# Patient Record
Sex: MALE | Race: WHITE | Employment: OTHER | URBAN - METROPOLITAN AREA
[De-identification: names, ages, dates, MRNs, and addresses within clinical notes are randomized per-mention and may not be internally consistent; named-entity substitution may affect disease eponyms.]

---

## 2017-12-01 PROCEDURE — 83010 ASSAY OF HAPTOGLOBIN QUANT: CPT | Performed by: PHYSICIAN ASSISTANT

## 2017-12-06 ENCOUNTER — APPOINTMENT (OUTPATIENT)
Dept: LAB | Age: 82
End: 2017-12-06
Attending: INTERNAL MEDICINE
Payer: MEDICARE

## 2017-12-06 DIAGNOSIS — C83.10 MANTLE CELL LYMPHOMA, UNSPECIFIED BODY REGION (HCC): ICD-10-CM

## 2017-12-06 PROCEDURE — 88184 FLOWCYTOMETRY/ TC 1 MARKER: CPT | Performed by: INTERNAL MEDICINE

## 2017-12-06 PROCEDURE — 88341 IMHCHEM/IMCYTCHM EA ADD ANTB: CPT

## 2017-12-06 PROCEDURE — 88264 CHROMOSOME ANALYSIS 20-25: CPT | Performed by: INTERNAL MEDICINE

## 2017-12-06 PROCEDURE — 88313 SPECIAL STAINS GROUP 2: CPT

## 2017-12-06 PROCEDURE — 88342 IMHCHEM/IMCYTCHM 1ST ANTB: CPT

## 2017-12-06 PROCEDURE — 88185 FLOWCYTOMETRY/TC ADD-ON: CPT | Performed by: INTERNAL MEDICINE

## 2017-12-06 PROCEDURE — 88237 TISSUE CULTURE BONE MARROW: CPT | Performed by: INTERNAL MEDICINE

## 2017-12-06 PROCEDURE — 85097 BONE MARROW INTERPRETATION: CPT

## 2017-12-06 PROCEDURE — 88311 DECALCIFY TISSUE: CPT

## 2017-12-06 PROCEDURE — 88271 CYTOGENETICS DNA PROBE: CPT | Performed by: INTERNAL MEDICINE

## 2017-12-06 PROCEDURE — 88305 TISSUE EXAM BY PATHOLOGIST: CPT

## 2017-12-06 PROCEDURE — 88275 CYTOGENETICS 100-300: CPT | Performed by: INTERNAL MEDICINE

## 2018-01-01 ENCOUNTER — TELEPHONE (OUTPATIENT)
Dept: SURGERY | Facility: CLINIC | Age: 83
End: 2018-01-01

## 2018-01-01 ENCOUNTER — APPOINTMENT (OUTPATIENT)
Dept: GENERAL RADIOLOGY | Facility: HOSPITAL | Age: 83
DRG: 955 | End: 2018-01-01
Payer: MEDICARE

## 2018-01-01 ENCOUNTER — APPOINTMENT (OUTPATIENT)
Dept: CT IMAGING | Facility: HOSPITAL | Age: 83
End: 2018-01-01
Attending: EMERGENCY MEDICINE
Payer: MEDICARE

## 2018-01-01 ENCOUNTER — APPOINTMENT (OUTPATIENT)
Dept: CV DIAGNOSTICS | Facility: HOSPITAL | Age: 83
DRG: 955 | End: 2018-01-01
Attending: HOSPITALIST
Payer: MEDICARE

## 2018-01-01 ENCOUNTER — APPOINTMENT (OUTPATIENT)
Dept: GENERAL RADIOLOGY | Facility: HOSPITAL | Age: 83
DRG: 840 | End: 2018-01-01
Attending: INTERNAL MEDICINE
Payer: MEDICARE

## 2018-01-01 ENCOUNTER — APPOINTMENT (OUTPATIENT)
Dept: CT IMAGING | Facility: HOSPITAL | Age: 83
DRG: 955 | End: 2018-01-01
Attending: NEUROLOGICAL SURGERY
Payer: MEDICARE

## 2018-01-01 ENCOUNTER — SURGERY (OUTPATIENT)
Age: 83
End: 2018-01-01

## 2018-01-01 ENCOUNTER — APPOINTMENT (OUTPATIENT)
Dept: HEMATOLOGY/ONCOLOGY | Facility: HOSPITAL | Age: 83
End: 2018-01-01
Attending: INTERNAL MEDICINE
Payer: MEDICARE

## 2018-01-01 ENCOUNTER — APPOINTMENT (OUTPATIENT)
Dept: ULTRASOUND IMAGING | Facility: HOSPITAL | Age: 83
DRG: 840 | End: 2018-01-01
Attending: INTERNAL MEDICINE
Payer: MEDICARE

## 2018-01-01 ENCOUNTER — APPOINTMENT (OUTPATIENT)
Dept: GENERAL RADIOLOGY | Facility: HOSPITAL | Age: 83
DRG: 955 | End: 2018-01-01
Attending: HOSPITALIST
Payer: MEDICARE

## 2018-01-01 ENCOUNTER — APPOINTMENT (OUTPATIENT)
Dept: CT IMAGING | Facility: HOSPITAL | Age: 83
DRG: 955 | End: 2018-01-01
Attending: PHYSICIAN ASSISTANT
Payer: MEDICARE

## 2018-01-01 ENCOUNTER — OFFICE VISIT (OUTPATIENT)
Dept: SURGERY | Facility: CLINIC | Age: 83
End: 2018-01-01
Payer: MEDICARE

## 2018-01-01 ENCOUNTER — OFFICE VISIT (OUTPATIENT)
Dept: SURGERY | Facility: CLINIC | Age: 83
End: 2018-01-01

## 2018-01-01 ENCOUNTER — HOSPITAL ENCOUNTER (INPATIENT)
Facility: HOSPITAL | Age: 83
LOS: 14 days | Discharge: NURSING, REHAB OR LONG-TERM CARE | DRG: 840 | End: 2018-01-01
Attending: HOSPITALIST | Admitting: HOSPITALIST
Payer: MEDICARE

## 2018-01-01 ENCOUNTER — APPOINTMENT (OUTPATIENT)
Dept: CT IMAGING | Facility: HOSPITAL | Age: 83
DRG: 955 | End: 2018-01-01
Attending: Other
Payer: MEDICARE

## 2018-01-01 ENCOUNTER — LAB REQUISITION (OUTPATIENT)
Dept: LAB | Facility: HOSPITAL | Age: 83
End: 2018-01-01
Payer: MEDICARE

## 2018-01-01 ENCOUNTER — APPOINTMENT (OUTPATIENT)
Dept: CT IMAGING | Facility: HOSPITAL | Age: 83
DRG: 955 | End: 2018-01-01
Payer: MEDICARE

## 2018-01-01 ENCOUNTER — HOSPITAL ENCOUNTER (OUTPATIENT)
Dept: CT IMAGING | Facility: HOSPITAL | Age: 83
Discharge: HOME | End: 2018-01-01
Attending: PHYSICIAN ASSISTANT
Payer: MEDICARE

## 2018-01-01 ENCOUNTER — HOSPITAL ENCOUNTER (OUTPATIENT)
Dept: CT IMAGING | Facility: HOSPITAL | Age: 83
Discharge: HOME | End: 2018-01-01
Attending: NEUROLOGICAL SURGERY
Payer: MEDICARE

## 2018-01-01 ENCOUNTER — HOSPITAL ENCOUNTER (INPATIENT)
Facility: HOSPITAL | Age: 83
LOS: 6 days | Discharge: NURSING, REHAB OR LONG-TERM CARE | DRG: 955 | End: 2018-01-01
Attending: EMERGENCY MEDICINE | Admitting: HOSPITALIST
Payer: MEDICARE

## 2018-01-01 ENCOUNTER — ANESTHESIA (OUTPATIENT)
Dept: SURGERY | Facility: HOSPITAL | Age: 83
End: 2018-01-01

## 2018-01-01 ENCOUNTER — ANESTHESIA EVENT (OUTPATIENT)
Dept: SURGERY | Facility: HOSPITAL | Age: 83
End: 2018-01-01

## 2018-01-01 ENCOUNTER — HOSPITAL ENCOUNTER (EMERGENCY)
Facility: HOSPITAL | Age: 83
Discharge: HOME | End: 2018-01-01
Attending: EMERGENCY MEDICINE
Payer: MEDICARE

## 2018-01-01 VITALS
WEIGHT: 152 LBS | DIASTOLIC BLOOD PRESSURE: 54 MMHG | RESPIRATION RATE: 18 BRPM | TEMPERATURE: 98 F | OXYGEN SATURATION: 95 % | HEIGHT: 69 IN | SYSTOLIC BLOOD PRESSURE: 104 MMHG | HEART RATE: 63 BPM | BODY MASS INDEX: 22.51 KG/M2

## 2018-01-01 VITALS
SYSTOLIC BLOOD PRESSURE: 102 MMHG | OXYGEN SATURATION: 90 % | HEART RATE: 60 BPM | RESPIRATION RATE: 18 BRPM | DIASTOLIC BLOOD PRESSURE: 57 MMHG | WEIGHT: 150.19 LBS | TEMPERATURE: 98 F | BODY MASS INDEX: 23 KG/M2

## 2018-01-01 VITALS — SYSTOLIC BLOOD PRESSURE: 100 MMHG | HEART RATE: 76 BPM | DIASTOLIC BLOOD PRESSURE: 46 MMHG | RESPIRATION RATE: 18 BRPM

## 2018-01-01 VITALS
DIASTOLIC BLOOD PRESSURE: 52 MMHG | SYSTOLIC BLOOD PRESSURE: 111 MMHG | HEART RATE: 64 BPM | OXYGEN SATURATION: 99 % | TEMPERATURE: 98 F | BODY MASS INDEX: 24 KG/M2 | WEIGHT: 159.63 LBS | RESPIRATION RATE: 18 BRPM

## 2018-01-01 VITALS — DIASTOLIC BLOOD PRESSURE: 58 MMHG | HEART RATE: 72 BPM | SYSTOLIC BLOOD PRESSURE: 122 MMHG

## 2018-01-01 DIAGNOSIS — G50.0 TRIGEMINAL NEURALGIA OF RIGHT SIDE OF FACE: Primary | ICD-10-CM

## 2018-01-01 DIAGNOSIS — I10 ESSENTIAL (PRIMARY) HYPERTENSION: ICD-10-CM

## 2018-01-01 DIAGNOSIS — Z98.890 HISTORY OF BURR HOLE SURGERY: ICD-10-CM

## 2018-01-01 DIAGNOSIS — Z98.890 STATUS POST CRANIECTOMY: ICD-10-CM

## 2018-01-01 DIAGNOSIS — S06.5X9A SUBDURAL HEMATOMA (HCC): Primary | ICD-10-CM

## 2018-01-01 DIAGNOSIS — S06.5X9A ACUTE SUBDURAL HEMATOMA (HCC): ICD-10-CM

## 2018-01-01 DIAGNOSIS — S06.5X9A ACUTE SUBDURAL HEMATOMA (HCC): Primary | ICD-10-CM

## 2018-01-01 DIAGNOSIS — D69.6 THROMBOCYTOPENIA (HCC): ICD-10-CM

## 2018-01-01 DIAGNOSIS — S06.5X9A SUBDURAL HEMATOMA (HCC): ICD-10-CM

## 2018-01-01 DIAGNOSIS — D64.9 ANEMIA, UNSPECIFIED TYPE: ICD-10-CM

## 2018-01-01 LAB
ALBUMIN SERPL-MCNC: 2 G/DL (ref 3.5–4.8)
ALBUMIN SERPL-MCNC: 2.1 G/DL (ref 3.5–4.8)
ALBUMIN SERPL-MCNC: 2.2 G/DL (ref 3.5–4.8)
ALBUMIN SERPL-MCNC: 2.2 G/DL (ref 3.5–4.8)
ALBUMIN SERPL-MCNC: 3 G/DL (ref 3.5–4.8)
ALBUMIN SERPL-MCNC: 3.3 G/DL (ref 3.5–4.8)
ALBUMIN/GLOB SERPL: 0.6 {RATIO} (ref 1–2)
ALBUMIN/GLOB SERPL: 0.7 {RATIO} (ref 1–2)
ALLENS TEST: POSITIVE
ALLENS TEST: POSITIVE
ALP LIVER SERPL-CCNC: 157 U/L (ref 45–117)
ALP LIVER SERPL-CCNC: 179 U/L (ref 45–117)
ALP LIVER SERPL-CCNC: 192 U/L (ref 45–117)
ALP LIVER SERPL-CCNC: 201 U/L (ref 45–117)
ALP LIVER SERPL-CCNC: 292 U/L (ref 45–117)
ALP LIVER SERPL-CCNC: 323 U/L (ref 45–117)
ALT SERPL-CCNC: 19 U/L (ref 17–63)
ALT SERPL-CCNC: 20 U/L (ref 17–63)
ALT SERPL-CCNC: 22 U/L (ref 17–63)
ALT SERPL-CCNC: 22 U/L (ref 17–63)
ALT SERPL-CCNC: 28 U/L (ref 17–63)
ALT SERPL-CCNC: 32 U/L (ref 17–63)
ANION GAP SERPL CALC-SCNC: 10 MMOL/L (ref 0–18)
ANION GAP SERPL CALC-SCNC: 10 MMOL/L (ref 0–18)
ANION GAP SERPL CALC-SCNC: 5 MMOL/L (ref 0–18)
ANION GAP SERPL CALC-SCNC: 6 MMOL/L (ref 0–18)
ANION GAP SERPL CALC-SCNC: 7 MMOL/L (ref 0–18)
ANION GAP SERPL CALC-SCNC: 8 MMOL/L (ref 0–18)
ANION GAP SERPL CALC-SCNC: 9 MMOL/L (ref 0–18)
ANTIBODY SCREEN: NEGATIVE
APTT PPP: 28.1 SECONDS (ref 26.1–34.6)
APTT PPP: 30.1 SECONDS (ref 26.1–34.6)
APTT PPP: 30.6 SECONDS (ref 26.1–34.6)
APTT PPP: 31.1 SECONDS (ref 26.1–34.6)
APTT PPP: 34.5 SECONDS (ref 26.1–34.6)
ARTERIAL BLD GAS O2 SATURATION: 95 % (ref 92–100)
ARTERIAL BLD GAS O2 SATURATION: 97 % (ref 92–100)
ARTERIAL BLOOD GAS BASE EXCESS: -2.9
ARTERIAL BLOOD GAS BASE EXCESS: -3.4
ARTERIAL BLOOD GAS HCO3: 21 MEQ/L (ref 22–26)
ARTERIAL BLOOD GAS HCO3: 21.1 MEQ/L (ref 22–26)
ARTERIAL BLOOD GAS PCO2: 32 MM HG (ref 35–45)
ARTERIAL BLOOD GAS PCO2: 35 MM HG (ref 35–45)
ARTERIAL BLOOD GAS PH: 7.4 (ref 7.35–7.45)
ARTERIAL BLOOD GAS PH: 7.43 (ref 7.35–7.45)
ARTERIAL BLOOD GAS PO2: 124 MM HG (ref 80–105)
ARTERIAL BLOOD GAS PO2: 72 MM HG (ref 80–105)
AST SERPL-CCNC: 32 U/L (ref 15–41)
AST SERPL-CCNC: 34 U/L (ref 15–41)
AST SERPL-CCNC: 34 U/L (ref 15–41)
AST SERPL-CCNC: 37 U/L (ref 15–41)
AST SERPL-CCNC: 41 U/L (ref 15–41)
AST SERPL-CCNC: 49 U/L (ref 15–41)
ATRIAL RATE: 59 BPM
BAND %: 1 %
BAND %: 1 %
BAND %: 2 %
BASOPHIL % MANUAL: 0 %
BASOPHIL % MANUAL: 1 %
BASOPHIL ABSOLUTE MANUAL: 0 X10(3) UL (ref 0–0.1)
BASOPHIL ABSOLUTE MANUAL: 0.18 X10(3) UL (ref 0–0.1)
BASOPHIL PLEURAL FLUID: 0 %
BASOPHILS # BLD AUTO: 0.04 X10(3) UL (ref 0–0.1)
BASOPHILS # BLD AUTO: 0.05 X10(3) UL (ref 0–0.1)
BASOPHILS # BLD AUTO: 0.06 X10(3) UL (ref 0–0.1)
BASOPHILS # BLD AUTO: 0.07 X10(3) UL (ref 0–0.1)
BASOPHILS NFR BLD AUTO: 0.1 %
BASOPHILS NFR BLD AUTO: 0.4 %
BASOPHILS NFR BLD AUTO: 0.4 %
BASOPHILS NFR BLD AUTO: 0.8 %
BILIRUB SERPL-MCNC: 0.3 MG/DL (ref 0.1–2)
BILIRUB SERPL-MCNC: 0.4 MG/DL (ref 0.1–2)
BILIRUB SERPL-MCNC: 0.5 MG/DL (ref 0.1–2)
BILIRUB UR QL STRIP.AUTO: NEGATIVE
BILIRUB UR QL STRIP.AUTO: NEGATIVE
BLOOD TYPE BARCODE: 5100
BLOOD TYPE BARCODE: 5100
BLOOD TYPE BARCODE: 6200
BNP SERPL-MCNC: 285 PG/ML (ref 2–99)
BUN BLD-MCNC: 11 MG/DL (ref 8–20)
BUN BLD-MCNC: 12 MG/DL (ref 8–20)
BUN BLD-MCNC: 14 MG/DL (ref 8–20)
BUN BLD-MCNC: 17 MG/DL (ref 8–20)
BUN BLD-MCNC: 18 MG/DL (ref 8–20)
BUN BLD-MCNC: 20 MG/DL (ref 8–20)
BUN BLD-MCNC: 23 MG/DL (ref 8–20)
BUN BLD-MCNC: 28 MG/DL (ref 8–20)
BUN BLD-MCNC: 29 MG/DL (ref 8–20)
BUN BLD-MCNC: 33 MG/DL (ref 8–20)
BUN BLD-MCNC: 34 MG/DL (ref 8–20)
BUN BLD-MCNC: 37 MG/DL (ref 8–20)
BUN BLD-MCNC: 44 MG/DL (ref 8–20)
BUN BLD-MCNC: 44 MG/DL (ref 8–20)
BUN BLD-MCNC: 48 MG/DL (ref 8–20)
BUN BLD-MCNC: 54 MG/DL (ref 8–20)
BUN BLD-MCNC: 55 MG/DL (ref 8–20)
BUN/CREAT SERPL: 16.5 (ref 10–20)
BUN/CREAT SERPL: 17.2 (ref 10–20)
BUN/CREAT SERPL: 17.9 (ref 10–20)
BUN/CREAT SERPL: 18.5 (ref 10–20)
BUN/CREAT SERPL: 20.5 (ref 10–20)
BUN/CREAT SERPL: 23.4 (ref 10–20)
BUN/CREAT SERPL: 24.8 (ref 10–20)
BUN/CREAT SERPL: 26.4 (ref 10–20)
BUN/CREAT SERPL: 31.9 (ref 10–20)
BUN/CREAT SERPL: 35.8 (ref 10–20)
BUN/CREAT SERPL: 36.3 (ref 10–20)
BUN/CREAT SERPL: 39.6 (ref 10–20)
BUN/CREAT SERPL: 40.7 (ref 10–20)
BUN/CREAT SERPL: 46.2 (ref 10–20)
CALCIUM BLD-MCNC: 6.7 MG/DL (ref 8.3–10.3)
CALCIUM BLD-MCNC: 7.3 MG/DL (ref 8.3–10.3)
CALCIUM BLD-MCNC: 7.5 MG/DL (ref 8.3–10.3)
CALCIUM BLD-MCNC: 7.6 MG/DL (ref 8.3–10.3)
CALCIUM BLD-MCNC: 7.7 MG/DL (ref 8.3–10.3)
CALCIUM BLD-MCNC: 7.8 MG/DL (ref 8.3–10.3)
CALCIUM BLD-MCNC: 7.8 MG/DL (ref 8.3–10.3)
CALCIUM BLD-MCNC: 7.9 MG/DL (ref 8.3–10.3)
CALCIUM BLD-MCNC: 8 MG/DL (ref 8.3–10.3)
CALCIUM BLD-MCNC: 8.1 MG/DL (ref 8.3–10.3)
CALCIUM BLD-MCNC: 8.1 MG/DL (ref 8.3–10.3)
CALCIUM BLD-MCNC: 8.2 MG/DL (ref 8.3–10.3)
CALCIUM BLD-MCNC: 8.3 MG/DL (ref 8.3–10.3)
CALCIUM BLD-MCNC: 8.3 MG/DL (ref 8.3–10.3)
CALCIUM BLD-MCNC: 8.4 MG/DL (ref 8.3–10.3)
CALCIUM BLD-MCNC: 8.8 MG/DL (ref 8.3–10.3)
CALCULATED O2 SATURATION: 95 % (ref 92–100)
CALCULATED O2 SATURATION: 99 % (ref 92–100)
CARBOXYHEMOGLOBIN: 1.2 % SAT (ref 0–3)
CARBOXYHEMOGLOBIN: 1.2 % SAT (ref 0–3)
CHLORIDE SERPL-SCNC: 100 MMOL/L (ref 101–111)
CHLORIDE SERPL-SCNC: 100 MMOL/L (ref 101–111)
CHLORIDE SERPL-SCNC: 102 MMOL/L (ref 101–111)
CHLORIDE SERPL-SCNC: 105 MMOL/L (ref 101–111)
CHLORIDE SERPL-SCNC: 107 MMOL/L (ref 101–111)
CHLORIDE SERPL-SCNC: 109 MMOL/L (ref 101–111)
CHLORIDE SERPL-SCNC: 110 MMOL/L (ref 101–111)
CHLORIDE SERPL-SCNC: 99 MMOL/L (ref 101–111)
CHLORIDE SERPL-SCNC: 99 MMOL/L (ref 101–111)
CHLORIDE: 103 MMOL/L (ref 101–111)
CHLORIDE: 104 MMOL/L (ref 101–111)
CHLORIDE: 105 MMOL/L (ref 101–111)
CHLORIDE: 106 MMOL/L (ref 101–111)
CHLORIDE: 107 MMOL/L (ref 101–111)
CHLORIDE: 107 MMOL/L (ref 101–111)
CHOLEST SMN-MCNC: 129 MG/DL (ref ?–200)
CLARITY UR REFRACT.AUTO: CLEAR
CLARITY UR REFRACT.AUTO: CLEAR
CO2 SERPL-SCNC: 21 MMOL/L (ref 22–32)
CO2 SERPL-SCNC: 23 MMOL/L (ref 22–32)
CO2 SERPL-SCNC: 25 MMOL/L (ref 22–32)
CO2 SERPL-SCNC: 26 MMOL/L (ref 22–32)
CO2 SERPL-SCNC: 26 MMOL/L (ref 22–32)
CO2 SERPL-SCNC: 27 MMOL/L (ref 22–32)
CO2 SERPL-SCNC: 28 MMOL/L (ref 22–32)
CO2 SERPL-SCNC: 29 MMOL/L (ref 22–32)
CO2 SERPL-SCNC: 30 MMOL/L (ref 22–32)
CO2: 20 MMOL/L (ref 22–32)
CO2: 22 MMOL/L (ref 22–32)
CO2: 23 MMOL/L (ref 22–32)
CO2: 23 MMOL/L (ref 22–32)
CO2: 25 MMOL/L (ref 22–32)
CO2: 25 MMOL/L (ref 22–32)
COLOR UR AUTO: YELLOW
COLOR UR AUTO: YELLOW
CREAT BLD-MCNC: 0.81 MG/DL (ref 0.7–1.3)
CREAT BLD-MCNC: 0.9 MG/DL (ref 0.7–1.3)
CREAT BLD-MCNC: 0.9 MG/DL (ref 0.7–1.3)
CREAT BLD-MCNC: 0.92 MG/DL (ref 0.7–1.3)
CREAT BLD-MCNC: 0.96 MG/DL (ref 0.7–1.3)
CREAT BLD-MCNC: 0.99 MG/DL (ref 0.7–1.3)
CREAT BLD-MCNC: 1.02 MG/DL (ref 0.7–1.3)
CREAT BLD-MCNC: 1.06 MG/DL (ref 0.7–1.3)
CREAT BLD-MCNC: 1.08 MG/DL (ref 0.7–1.3)
CREAT BLD-MCNC: 1.09 MG/DL (ref 0.7–1.3)
CREAT BLD-MCNC: 1.11 MG/DL (ref 0.7–1.3)
CREAT BLD-MCNC: 1.12 MG/DL (ref 0.7–1.3)
CREAT BLD-MCNC: 1.17 MG/DL (ref 0.7–1.3)
CREAT BLD-MCNC: 1.24 MG/DL (ref 0.7–1.3)
CREAT BLD-MCNC: 1.24 MG/DL (ref 0.7–1.3)
CREAT BLD-MCNC: 1.33 MG/DL (ref 0.7–1.3)
CREAT BLD-MCNC: 1.34 MG/DL (ref 0.7–1.3)
CREAT BLD-MCNC: 1.35 MG/DL (ref 0.7–1.3)
CREAT BLD-MCNC: 1.38 MG/DL (ref 0.7–1.3)
CREAT BLD-MCNC: 1.66 MG/DL (ref 0.7–1.3)
CREAT UR-SCNC: 140 MG/DL
DAT (C3D): NEGATIVE
DAT (IGG): NEGATIVE
EOSINOPHIL # BLD AUTO: 0.02 X10(3) UL (ref 0–0.3)
EOSINOPHIL # BLD AUTO: 0.05 X10(3) UL (ref 0–0.3)
EOSINOPHIL # BLD AUTO: 0.06 X10(3) UL (ref 0–0.3)
EOSINOPHIL # BLD AUTO: 0.17 X10(3) UL (ref 0–0.3)
EOSINOPHIL % MANUAL: 0 %
EOSINOPHIL % MANUAL: 1 %
EOSINOPHIL % MANUAL: 2 %
EOSINOPHIL % MANUAL: 3 %
EOSINOPHIL % MANUAL: 4 %
EOSINOPHIL % MANUAL: 5 %
EOSINOPHIL ABSOLUTE MANUAL: 0 X10(3) UL (ref 0–0.3)
EOSINOPHIL ABSOLUTE MANUAL: 0.72 X10(3) UL (ref 0–0.3)
EOSINOPHIL ABSOLUTE MANUAL: 0.77 X10(3) UL (ref 0–0.3)
EOSINOPHIL ABSOLUTE MANUAL: 0.8 X10(3) UL (ref 0–0.3)
EOSINOPHIL ABSOLUTE MANUAL: 1.14 X10(3) UL (ref 0–0.3)
EOSINOPHIL ABSOLUTE MANUAL: 1.59 X10(3) UL (ref 0–0.3)
EOSINOPHIL NFR BLD AUTO: 0.1 %
EOSINOPHIL NFR BLD AUTO: 0.4 %
EOSINOPHIL NFR BLD AUTO: 0.5 %
EOSINOPHIL NFR BLD AUTO: 0.6 %
EOSINOPHIL PLEURAL FLUID: 0 %
ERYTHROCYTE [DISTWIDTH] IN BLOOD BY AUTOMATED COUNT: 13.2 % (ref 11.5–16)
ERYTHROCYTE [DISTWIDTH] IN BLOOD BY AUTOMATED COUNT: 13.3 % (ref 11.5–16)
ERYTHROCYTE [DISTWIDTH] IN BLOOD BY AUTOMATED COUNT: 13.4 % (ref 11.5–16)
ERYTHROCYTE [DISTWIDTH] IN BLOOD BY AUTOMATED COUNT: 13.4 % (ref 11.5–16)
ERYTHROCYTE [DISTWIDTH] IN BLOOD BY AUTOMATED COUNT: 13.5 % (ref 11.5–16)
ERYTHROCYTE [DISTWIDTH] IN BLOOD BY AUTOMATED COUNT: 13.8 % (ref 11.5–16)
ERYTHROCYTE [DISTWIDTH] IN BLOOD BY AUTOMATED COUNT: 13.9 % (ref 11.5–16)
ERYTHROCYTE [DISTWIDTH] IN BLOOD BY AUTOMATED COUNT: 17 % (ref 11.5–16)
ERYTHROCYTE [DISTWIDTH] IN BLOOD BY AUTOMATED COUNT: 17.2 % (ref 11.5–16)
ERYTHROCYTE [DISTWIDTH] IN BLOOD BY AUTOMATED COUNT: 17.3 % (ref 11.5–16)
ERYTHROCYTE [DISTWIDTH] IN BLOOD BY AUTOMATED COUNT: 17.4 % (ref 11.5–16)
ERYTHROCYTE [DISTWIDTH] IN BLOOD BY AUTOMATED COUNT: 17.8 % (ref 11.5–16)
ERYTHROCYTE [DISTWIDTH] IN BLOOD BY AUTOMATED COUNT: 17.9 % (ref 11.5–16)
ERYTHROCYTE [DISTWIDTH] IN BLOOD BY AUTOMATED COUNT: 18 % (ref 11.5–16)
ERYTHROCYTE [DISTWIDTH] IN BLOOD BY AUTOMATED COUNT: 18.3 % (ref 11.5–16)
ERYTHROCYTE [DISTWIDTH] IN BLOOD BY AUTOMATED COUNT: 18.3 % (ref 11.5–16)
ERYTHROCYTE [DISTWIDTH] IN BLOOD BY AUTOMATED COUNT: 18.5 % (ref 11.5–16)
ERYTHROCYTE [DISTWIDTH] IN BLOOD BY AUTOMATED COUNT: 18.6 % (ref 11.5–16)
EST. AVERAGE GLUCOSE BLD GHB EST-MCNC: 140 MG/DL (ref 68–126)
EXPIRATORY PRESSURE: 5 CM H2O
EXPIRATORY PRESSURE: 5 CM H2O
FIO2: 100 %
FIO2: 50 %
GLOBULIN PLAS-MCNC: 3.2 G/DL (ref 2.5–4)
GLOBULIN PLAS-MCNC: 3.4 G/DL (ref 2.5–4)
GLOBULIN PLAS-MCNC: 3.5 G/DL (ref 2.5–4)
GLOBULIN PLAS-MCNC: 3.5 G/DL (ref 2.5–4)
GLUCOSE BLD-MCNC: 100 MG/DL (ref 65–99)
GLUCOSE BLD-MCNC: 101 MG/DL (ref 65–99)
GLUCOSE BLD-MCNC: 102 MG/DL (ref 70–99)
GLUCOSE BLD-MCNC: 104 MG/DL (ref 70–99)
GLUCOSE BLD-MCNC: 108 MG/DL (ref 70–99)
GLUCOSE BLD-MCNC: 109 MG/DL (ref 65–99)
GLUCOSE BLD-MCNC: 112 MG/DL (ref 70–99)
GLUCOSE BLD-MCNC: 113 MG/DL (ref 65–99)
GLUCOSE BLD-MCNC: 114 MG/DL (ref 65–99)
GLUCOSE BLD-MCNC: 116 MG/DL (ref 65–99)
GLUCOSE BLD-MCNC: 116 MG/DL (ref 65–99)
GLUCOSE BLD-MCNC: 118 MG/DL (ref 65–99)
GLUCOSE BLD-MCNC: 118 MG/DL (ref 65–99)
GLUCOSE BLD-MCNC: 119 MG/DL (ref 70–99)
GLUCOSE BLD-MCNC: 121 MG/DL (ref 70–99)
GLUCOSE BLD-MCNC: 122 MG/DL (ref 65–99)
GLUCOSE BLD-MCNC: 124 MG/DL (ref 65–99)
GLUCOSE BLD-MCNC: 124 MG/DL (ref 65–99)
GLUCOSE BLD-MCNC: 130 MG/DL (ref 65–99)
GLUCOSE BLD-MCNC: 132 MG/DL (ref 70–99)
GLUCOSE BLD-MCNC: 135 MG/DL (ref 65–99)
GLUCOSE BLD-MCNC: 137 MG/DL (ref 65–99)
GLUCOSE BLD-MCNC: 138 MG/DL (ref 70–99)
GLUCOSE BLD-MCNC: 139 MG/DL (ref 70–99)
GLUCOSE BLD-MCNC: 146 MG/DL (ref 65–99)
GLUCOSE BLD-MCNC: 146 MG/DL (ref 65–99)
GLUCOSE BLD-MCNC: 152 MG/DL (ref 70–99)
GLUCOSE BLD-MCNC: 164 MG/DL (ref 70–99)
GLUCOSE BLD-MCNC: 179 MG/DL (ref 65–99)
GLUCOSE BLD-MCNC: 196 MG/DL (ref 70–99)
GLUCOSE BLD-MCNC: 81 MG/DL (ref 70–99)
GLUCOSE BLD-MCNC: 86 MG/DL (ref 65–99)
GLUCOSE BLD-MCNC: 89 MG/DL (ref 70–99)
GLUCOSE BLD-MCNC: 89 MG/DL (ref 70–99)
GLUCOSE BLD-MCNC: 90 MG/DL (ref 70–99)
GLUCOSE BLD-MCNC: 90 MG/DL (ref 70–99)
GLUCOSE BLD-MCNC: 91 MG/DL (ref 70–99)
GLUCOSE BLD-MCNC: 92 MG/DL (ref 70–99)
GLUCOSE BLD-MCNC: 92 MG/DL (ref 70–99)
GLUCOSE BLD-MCNC: 93 MG/DL (ref 65–99)
GLUCOSE BLD-MCNC: 95 MG/DL (ref 65–99)
GLUCOSE BLD-MCNC: 97 MG/DL (ref 65–99)
GLUCOSE BLD-MCNC: 99 MG/DL (ref 65–99)
GLUCOSE PLR-MCNC: 185 MG/DL
GLUCOSE UR STRIP.AUTO-MCNC: NEGATIVE MG/DL
GLUCOSE UR STRIP.AUTO-MCNC: NEGATIVE MG/DL
HAPTOGLOB SERPL-MCNC: 273 MG/DL (ref 30–200)
HAV IGM SER QL: 1.8 MG/DL (ref 1.8–2.5)
HAV IGM SER QL: 1.9 MG/DL (ref 1.8–2.5)
HAV IGM SER QL: 2.3 MG/DL (ref 1.8–2.5)
HAV IGM SER QL: 2.3 MG/DL (ref 1.8–2.5)
HBA1C MFR BLD HPLC: 6.5 % (ref ?–5.7)
HCT VFR BLD AUTO: 20.1 % (ref 37–53)
HCT VFR BLD AUTO: 21.5 % (ref 37–53)
HCT VFR BLD AUTO: 21.5 % (ref 37–53)
HCT VFR BLD AUTO: 22 % (ref 37–53)
HCT VFR BLD AUTO: 22.9 % (ref 37–53)
HCT VFR BLD AUTO: 23 % (ref 37–53)
HCT VFR BLD AUTO: 23.8 % (ref 37–53)
HCT VFR BLD AUTO: 23.8 % (ref 37–53)
HCT VFR BLD AUTO: 24.1 % (ref 37–53)
HCT VFR BLD AUTO: 24.2 % (ref 37–53)
HCT VFR BLD AUTO: 24.3 % (ref 37–53)
HCT VFR BLD AUTO: 25.4 % (ref 37–53)
HCT VFR BLD AUTO: 27.2 % (ref 37–53)
HCT VFR BLD AUTO: 30.1 % (ref 37–53)
HCT VFR BLD AUTO: 31.3 % (ref 37–53)
HCT VFR BLD AUTO: 31.6 % (ref 37–53)
HCT VFR BLD AUTO: 32.1 % (ref 37–53)
HCT VFR BLD AUTO: 32.9 % (ref 37–53)
HCT VFR BLD AUTO: 34.2 % (ref 37–53)
HCT VFR BLD AUTO: 34.2 % (ref 37–53)
HDLC SERPL-MCNC: 33 MG/DL (ref 45–?)
HDLC SERPL: 3.91 {RATIO} (ref ?–4.97)
HGB BLD-MCNC: 10.1 G/DL (ref 13–17)
HGB BLD-MCNC: 10.4 G/DL (ref 13–17)
HGB BLD-MCNC: 10.5 G/DL (ref 13–17)
HGB BLD-MCNC: 10.9 G/DL (ref 13–17)
HGB BLD-MCNC: 11 G/DL (ref 13–17)
HGB BLD-MCNC: 11.3 G/DL (ref 13–17)
HGB BLD-MCNC: 11.7 G/DL (ref 13–17)
HGB BLD-MCNC: 6.3 G/DL (ref 13–17)
HGB BLD-MCNC: 6.6 G/DL (ref 13–17)
HGB BLD-MCNC: 6.7 G/DL (ref 13–17)
HGB BLD-MCNC: 6.7 G/DL (ref 13–17)
HGB BLD-MCNC: 7.1 G/DL (ref 13–17)
HGB BLD-MCNC: 7.2 G/DL (ref 13–17)
HGB BLD-MCNC: 7.3 G/DL (ref 13–17)
HGB BLD-MCNC: 7.4 G/DL (ref 13–17)
HGB BLD-MCNC: 7.4 G/DL (ref 13–17)
HGB BLD-MCNC: 7.6 G/DL (ref 13–17)
HGB BLD-MCNC: 7.7 G/DL (ref 13–17)
HGB BLD-MCNC: 7.8 G/DL (ref 13–17)
HGB BLD-MCNC: 7.8 G/DL (ref 13–17)
HGB BLD-MCNC: 8.6 G/DL (ref 13–17)
HGB BLD-MCNC: 8.9 G/DL (ref 13–17)
IMMATURE GRANULOCYTE COUNT: 0.03 X10(3) UL (ref 0–1)
IMMATURE GRANULOCYTE COUNT: 0.12 X10(3) UL (ref 0–1)
IMMATURE GRANULOCYTE COUNT: 0.2 X10(3) UL (ref 0–1)
IMMATURE GRANULOCYTE COUNT: 0.37 X10(3) UL (ref 0–1)
IMMATURE GRANULOCYTE RATIO %: 0.3 %
IMMATURE GRANULOCYTE RATIO %: 0.9 %
IMMATURE GRANULOCYTE RATIO %: 1.4 %
IMMATURE GRANULOCYTE RATIO %: 1.4 %
INR BLD: 0.99 (ref 0.9–1.1)
INR BLD: 1.11 (ref 0.9–1.1)
INR BLD: 1.24 (ref 0.9–1.1)
INSP PRESSURE: 12 CM H2O
INSP PRESSURE: 12 CM H2O
IONIZED CALCIUM: 1.2 MMOL/L (ref 1.12–1.32)
KETONES UR STRIP.AUTO-MCNC: NEGATIVE MG/DL
KETONES UR STRIP.AUTO-MCNC: NEGATIVE MG/DL
LACTIC ACID ARTERIAL: <1.3 MMOL/L (ref 0.5–2)
LDH PLEURAL FLUID: 294 U/L
LDH: 433 U/L (ref 84–249)
LDH: 434 U/L (ref 84–249)
LDH: 671 U/L (ref 84–249)
LDLC SERPL CALC-MCNC: 76 MG/DL (ref ?–130)
LEGIONELLA PNEUMOPHILA AG, URI: NEGATIVE
LEUKOCYTE ESTERASE UR QL STRIP.AUTO: NEGATIVE
LYMPHOCYTE % MANUAL: 14 %
LYMPHOCYTE % MANUAL: 15 %
LYMPHOCYTE % MANUAL: 20 %
LYMPHOCYTE % MANUAL: 20 %
LYMPHOCYTE % MANUAL: 21 %
LYMPHOCYTE % MANUAL: 22 %
LYMPHOCYTE % MANUAL: 22 %
LYMPHOCYTE % MANUAL: 24 %
LYMPHOCYTE % MANUAL: 29 %
LYMPHOCYTE % MANUAL: 43 %
LYMPHOCYTE % MANUAL: 47 %
LYMPHOCYTE % MANUAL: 50 %
LYMPHOCYTE % MANUAL: 89 %
LYMPHOCYTE ABSOLUTE MANUAL: 16.16 X10(3) UL (ref 0.9–4)
LYMPHOCYTE ABSOLUTE MANUAL: 16.38 X10(3) UL (ref 0.9–4)
LYMPHOCYTE ABSOLUTE MANUAL: 16.54 X10(3) UL (ref 0.9–4)
LYMPHOCYTE ABSOLUTE MANUAL: 17.63 X10(3) UL (ref 0.9–4)
LYMPHOCYTE ABSOLUTE MANUAL: 18.19 X10(3) UL (ref 0.9–4)
LYMPHOCYTE ABSOLUTE MANUAL: 2.23 X10(3) UL (ref 0.9–4)
LYMPHOCYTE ABSOLUTE MANUAL: 2.69 X10(3) UL (ref 0.9–4)
LYMPHOCYTE ABSOLUTE MANUAL: 24.32 X10(3) UL (ref 0.9–4)
LYMPHOCYTE ABSOLUTE MANUAL: 26.96 X10(3) UL (ref 0.9–4)
LYMPHOCYTE ABSOLUTE MANUAL: 3.7 X10(3) UL (ref 0.9–4)
LYMPHOCYTE ABSOLUTE MANUAL: 34.27 X10(3) UL (ref 0.9–4)
LYMPHOCYTE ABSOLUTE MANUAL: 34.96 X10(3) UL (ref 0.9–4)
LYMPHOCYTE ABSOLUTE MANUAL: 44.23 X10(3) UL (ref 0.9–4)
LYMPHOCYTE ABSOLUTE MANUAL: 5.74 X10(3) UL (ref 0.9–4)
LYMPHOCYTE ABSOLUTE MANUAL: 6.55 X10(3) UL (ref 0.9–4)
LYMPHOCYTE PLEURAL FLUID: 96 %
LYMPHOCYTES # BLD AUTO: 1.43 X10(3) UL (ref 0.9–4)
LYMPHOCYTES # BLD AUTO: 2.69 X10(3) UL (ref 0.9–4)
LYMPHOCYTES # BLD AUTO: 3.54 X10(3) UL (ref 0.9–4)
LYMPHOCYTES # BLD AUTO: 4.35 X10(3) UL (ref 0.9–4)
LYMPHOCYTES NFR BLD AUTO: 10.7 %
LYMPHOCYTES NFR BLD AUTO: 16.7 %
LYMPHOCYTES NFR BLD AUTO: 23.8 %
LYMPHOCYTES NFR BLD AUTO: 24.7 %
Lab: 101.18 X10(3) UL (ref ?–0.01)
Lab: 13.38 X10(3) UL (ref ?–0.01)
Lab: 15
Lab: 19
Lab: 22
Lab: 32.34 X10(3) UL (ref ?–0.01)
Lab: 34.74 X10(3) UL (ref ?–0.01)
Lab: 35
Lab: 40
Lab: 43
Lab: 44
Lab: 49
Lab: 5.81 X10(3) UL (ref ?–0.01)
Lab: 56.74 X10(3) UL (ref ?–0.01)
Lab: 58
Lab: 6.48 X10(3) UL (ref ?–0.01)
Lab: 6.84 X10(3) UL (ref ?–0.01)
Lab: 60
Lab: 62
Lab: 7.24 X10(3) UL (ref ?–0.01)
Lab: 7.88 X10(3) UL (ref ?–0.01)
Lab: 77.04 X10(3) UL (ref ?–0.01)
Lab: 9.56 X10(3) UL (ref ?–0.01)
Lab: 92.16 X10(3) UL (ref ?–0.01)
M PROTEIN MFR SERPL ELPH: 5.4 G/DL (ref 6.1–8.3)
M PROTEIN MFR SERPL ELPH: 5.5 G/DL (ref 6.1–8.3)
M PROTEIN MFR SERPL ELPH: 5.5 G/DL (ref 6.1–8.3)
M PROTEIN MFR SERPL ELPH: 5.7 G/DL (ref 6.1–8.3)
M PROTEIN MFR SERPL ELPH: 6.8 G/DL (ref 6.1–8.3)
M PROTEIN MFR SERPL ELPH: 7.1 G/DL (ref 6.1–8.3)
MCH RBC QN AUTO: 29.3 PG (ref 27–33.2)
MCH RBC QN AUTO: 29.6 PG (ref 27–33.2)
MCH RBC QN AUTO: 29.6 PG (ref 27–33.2)
MCH RBC QN AUTO: 30.5 PG (ref 27–33.2)
MCH RBC QN AUTO: 30.6 PG (ref 27–33.2)
MCH RBC QN AUTO: 30.7 PG (ref 27–33.2)
MCH RBC QN AUTO: 30.8 PG (ref 27–33.2)
MCH RBC QN AUTO: 30.9 PG (ref 27–33.2)
MCH RBC QN AUTO: 31.2 PG (ref 27–33.2)
MCH RBC QN AUTO: 31.3 PG (ref 27–33.2)
MCH RBC QN AUTO: 31.5 PG (ref 27–33.2)
MCH RBC QN AUTO: 31.8 PG (ref 27–33.2)
MCH RBC QN AUTO: 31.8 PG (ref 27–33.2)
MCH RBC QN AUTO: 31.9 PG (ref 27–33.2)
MCH RBC QN AUTO: 32 PG (ref 27–33.2)
MCH RBC QN AUTO: 32.1 PG (ref 27–33.2)
MCHC RBC AUTO-ENTMCNC: 30.3 G/DL (ref 31–37)
MCHC RBC AUTO-ENTMCNC: 30.5 G/DL (ref 31–37)
MCHC RBC AUTO-ENTMCNC: 30.7 G/DL (ref 31–37)
MCHC RBC AUTO-ENTMCNC: 31 G/DL (ref 31–37)
MCHC RBC AUTO-ENTMCNC: 31.1 G/DL (ref 31–37)
MCHC RBC AUTO-ENTMCNC: 31.2 G/DL (ref 31–37)
MCHC RBC AUTO-ENTMCNC: 31.3 G/DL (ref 31–37)
MCHC RBC AUTO-ENTMCNC: 31.6 G/DL (ref 31–37)
MCHC RBC AUTO-ENTMCNC: 31.7 G/DL (ref 31–37)
MCHC RBC AUTO-ENTMCNC: 31.8 G/DL (ref 31–37)
MCHC RBC AUTO-ENTMCNC: 32.1 G/DL (ref 31–37)
MCHC RBC AUTO-ENTMCNC: 32.4 G/DL (ref 31–37)
MCHC RBC AUTO-ENTMCNC: 32.4 G/DL (ref 31–37)
MCHC RBC AUTO-ENTMCNC: 32.9 G/DL (ref 31–37)
MCHC RBC AUTO-ENTMCNC: 33 G/DL (ref 31–37)
MCHC RBC AUTO-ENTMCNC: 33.1 G/DL (ref 31–37)
MCHC RBC AUTO-ENTMCNC: 33.5 G/DL (ref 31–37)
MCHC RBC AUTO-ENTMCNC: 33.6 G/DL (ref 31–37)
MCHC RBC AUTO-ENTMCNC: 34.2 G/DL (ref 31–37)
MCHC RBC AUTO-ENTMCNC: 34.3 G/DL (ref 31–37)
MCV RBC AUTO: 100 FL (ref 80–99)
MCV RBC AUTO: 100.4 FL (ref 80–99)
MCV RBC AUTO: 101.6 FL (ref 80–99)
MCV RBC AUTO: 92.9 FL (ref 80–99)
MCV RBC AUTO: 93.5 FL (ref 80–99)
MCV RBC AUTO: 93.6 FL (ref 80–99)
MCV RBC AUTO: 94.9 FL (ref 80–99)
MCV RBC AUTO: 95.1 FL (ref 80–99)
MCV RBC AUTO: 95.1 FL (ref 80–99)
MCV RBC AUTO: 95.2 FL (ref 80–99)
MCV RBC AUTO: 95.3 FL (ref 80–99)
MCV RBC AUTO: 95.3 FL (ref 80–99)
MCV RBC AUTO: 95.8 FL (ref 80–99)
MCV RBC AUTO: 96 FL (ref 80–99)
MCV RBC AUTO: 96.1 FL (ref 80–99)
MCV RBC AUTO: 96.3 FL (ref 80–99)
MCV RBC AUTO: 96.4 FL (ref 80–99)
MCV RBC AUTO: 98.6 FL (ref 80–99)
MCV RBC AUTO: 99.1 FL (ref 80–99)
MCV RBC AUTO: 99.6 FL (ref 80–99)
METAMYELOCYTE %: 2 %
METAMYELOCYTE ABSOLUTE MANUAL: 1.5 X10(3) UL (ref ?–0.01)
METHEMOGLOBIN: 0.4 % SAT (ref 0.4–1.5)
METHEMOGLOBIN: 0.5 % SAT (ref 0.4–1.5)
MONO/MAC/HISTIO PLEURAL FLUID: 2 %
MONOCYTE % MANUAL: 1 %
MONOCYTE % MANUAL: 3 %
MONOCYTE % MANUAL: 3 %
MONOCYTE % MANUAL: 4 %
MONOCYTE % MANUAL: 4 %
MONOCYTE % MANUAL: 5 %
MONOCYTE % MANUAL: 5 %
MONOCYTE % MANUAL: 6 %
MONOCYTE % MANUAL: 6 %
MONOCYTE % MANUAL: 7 %
MONOCYTE ABSOLUTE MANUAL: 0.18 X10(3) UL (ref 0.1–1)
MONOCYTE ABSOLUTE MANUAL: 0.38 X10(3) UL (ref 0.1–1)
MONOCYTE ABSOLUTE MANUAL: 0.39 X10(3) UL (ref 0.1–1)
MONOCYTE ABSOLUTE MANUAL: 0.48 X10(3) UL (ref 0.1–1)
MONOCYTE ABSOLUTE MANUAL: 0.5 X10(3) UL (ref 0.1–1)
MONOCYTE ABSOLUTE MANUAL: 0.72 X10(3) UL (ref 0.1–1)
MONOCYTE ABSOLUTE MANUAL: 0.74 X10(3) UL (ref 0.1–1)
MONOCYTE ABSOLUTE MANUAL: 0.79 X10(3) UL (ref 0.1–1)
MONOCYTE ABSOLUTE MANUAL: 11.42 X10(3) UL (ref 0.1–1)
MONOCYTE ABSOLUTE MANUAL: 3.04 X10(3) UL (ref 0.1–1)
MONOCYTE ABSOLUTE MANUAL: 4.04 X10(3) UL (ref 0.1–1)
MONOCYTE ABSOLUTE MANUAL: 4.63 X10(3) UL (ref 0.1–1)
MONOCYTE ABSOLUTE MANUAL: 5.26 X10(3) UL (ref 0.1–1)
MONOCYTE ABSOLUTE MANUAL: 8.99 X10(3) UL (ref 0.1–1)
MONOCYTE ABSOLUTE MANUAL: 9.53 X10(3) UL (ref 0.1–1)
MONOCYTES # BLD AUTO: 26.28 X10(3) UL (ref 0.1–1)
MONOCYTES # BLD AUTO: 3.19 X10(3) UL (ref 0.1–1)
MONOCYTES # BLD AUTO: 5.15 X10(3) UL (ref 0.1–1)
MONOCYTES # BLD AUTO: 5.29 X10(3) UL (ref 0.1–1)
MONOCYTES NFR BLD AUTO: 35.9 %
MONOCYTES NFR BLD AUTO: 37.2 %
MONOCYTES NFR BLD AUTO: 46.8 %
MONOCYTES NFR BLD AUTO: 64.1 %
MORPHOLOGY: NORMAL
MYELOCYTE %: 1 %
MYELOCYTE %: 1 %
MYELOCYTE %: 2 %
MYELOCYTE ABSOLUTE MANUAL: 0.24 X10(3) UL (ref ?–0.01)
MYELOCYTE ABSOLUTE MANUAL: 1.16 X10(3) UL (ref ?–0.01)
MYELOCYTE ABSOLUTE MANUAL: 1.22 X10(3) UL (ref ?–0.01)
NEUTROPHIL ABS PRELIM: 12.58 X10 (3) UL (ref 1.3–6.7)
NEUTROPHIL ABS PRELIM: 12.91 X10 (3) UL (ref 1.3–6.7)
NEUTROPHIL ABS PRELIM: 17.75 X10 (3) UL (ref 1.3–6.7)
NEUTROPHIL ABS PRELIM: 19.36 X10 (3) UL (ref 1.3–6.7)
NEUTROPHIL ABS PRELIM: 23.69 X10 (3) UL (ref 1.3–6.7)
NEUTROPHIL ABS PRELIM: 26.32 X10 (3) UL (ref 1.3–6.7)
NEUTROPHIL ABS PRELIM: 3.18 X10 (3) UL (ref 1.3–6.7)
NEUTROPHIL ABS PRELIM: 3.71 X10 (3) UL (ref 1.3–6.7)
NEUTROPHIL ABS PRELIM: 3.88 X10 (3) UL (ref 1.3–6.7)
NEUTROPHIL ABS PRELIM: 38.58 X10 (3) UL (ref 1.3–6.7)
NEUTROPHIL ABS PRELIM: 4.53 X10 (3) UL (ref 1.3–6.7)
NEUTROPHIL ABS PRELIM: 4.68 X10 (3) UL (ref 1.3–6.7)
NEUTROPHIL ABS PRELIM: 4.72 X10 (3) UL (ref 1.3–6.7)
NEUTROPHIL ABS PRELIM: 49.28 X10 (3) UL (ref 1.3–6.7)
NEUTROPHIL ABS PRELIM: 5.39 X10 (3) UL (ref 1.3–6.7)
NEUTROPHIL ABS PRELIM: 58.38 X10 (3) UL (ref 1.3–6.7)
NEUTROPHIL ABS PRELIM: 6.54 X10 (3) UL (ref 1.3–6.7)
NEUTROPHIL ABS PRELIM: 67.59 X10 (3) UL (ref 1.3–6.7)
NEUTROPHIL ABS PRELIM: 9.76 X10 (3) UL (ref 1.3–6.7)
NEUTROPHIL ABSOLUTE MANUAL: 12.95 X10(3) UL (ref 1.3–6.7)
NEUTROPHIL ABSOLUTE MANUAL: 13.55 X10(3) UL (ref 1.3–6.7)
NEUTROPHIL ABSOLUTE MANUAL: 15.41 X10(3) UL (ref 1.3–6.7)
NEUTROPHIL ABSOLUTE MANUAL: 16.32 X10(3) UL (ref 1.3–6.7)
NEUTROPHIL ABSOLUTE MANUAL: 19.55 X10(3) UL (ref 1.3–6.7)
NEUTROPHIL ABSOLUTE MANUAL: 20.66 X10(3) UL (ref 1.3–6.7)
NEUTROPHIL ABSOLUTE MANUAL: 25.54 X10(3) UL (ref 1.3–6.7)
NEUTROPHIL ABSOLUTE MANUAL: 25.86 X10(3) UL (ref 1.3–6.7)
NEUTROPHIL ABSOLUTE MANUAL: 28.95 X10(3) UL (ref 1.3–6.7)
NEUTROPHIL ABSOLUTE MANUAL: 4.97 X10(3) UL (ref 1.3–6.7)
NEUTROPHIL ABSOLUTE MANUAL: 5.57 X10(3) UL (ref 1.3–6.7)
NEUTROPHIL ABSOLUTE MANUAL: 5.76 X10(3) UL (ref 1.3–6.7)
NEUTROPHIL ABSOLUTE MANUAL: 6.27 X10(3) UL (ref 1.3–6.7)
NEUTROPHIL ABSOLUTE MANUAL: 7.59 X10(3) UL (ref 1.3–6.7)
NEUTROPHIL ABSOLUTE MANUAL: 7.65 X10(3) UL (ref 1.3–6.7)
NEUTROPHIL PLEURAL FLUID: 2 %
NEUTROPHILS # BLD AUTO: 3.18 X10(3) UL (ref 1.3–6.7)
NEUTROPHILS # BLD AUTO: 3.71 X10(3) UL (ref 1.3–6.7)
NEUTROPHILS # BLD AUTO: 5.39 X10(3) UL (ref 1.3–6.7)
NEUTROPHILS # BLD AUTO: 9.76 X10(3) UL (ref 1.3–6.7)
NEUTROPHILS % MANUAL: 10 %
NEUTROPHILS % MANUAL: 12 %
NEUTROPHILS % MANUAL: 13 %
NEUTROPHILS % MANUAL: 24 %
NEUTROPHILS % MANUAL: 26 %
NEUTROPHILS % MANUAL: 30 %
NEUTROPHILS % MANUAL: 32 %
NEUTROPHILS % MANUAL: 32 %
NEUTROPHILS % MANUAL: 33 %
NEUTROPHILS % MANUAL: 35 %
NEUTROPHILS % MANUAL: 35 %
NEUTROPHILS % MANUAL: 40 %
NEUTROPHILS % MANUAL: 41 %
NEUTROPHILS % MANUAL: 44 %
NEUTROPHILS % MANUAL: 9 %
NEUTROPHILS NFR BLD AUTO: 23.8 %
NEUTROPHILS NFR BLD AUTO: 28.2 %
NEUTROPHILS NFR BLD AUTO: 37.5 %
NEUTROPHILS NFR BLD AUTO: 43.3 %
NITRITE UR QL STRIP.AUTO: NEGATIVE
NITRITE UR QL STRIP.AUTO: NEGATIVE
NONHDLC SERPL-MCNC: 96 MG/DL (ref ?–130)
NRBC CALCULATED: 1
NRBC CALCULATED: 2
NRBC CALCULATED: 3
NRBC CALCULATED: 4
OSMOLALITY SERPL CALC.SUM OF ELEC: 287 MOSM/KG (ref 275–295)
OSMOLALITY SERPL CALC.SUM OF ELEC: 287 MOSM/KG (ref 275–295)
OSMOLALITY SERPL CALC.SUM OF ELEC: 288 MOSM/KG (ref 275–295)
OSMOLALITY SERPL CALC.SUM OF ELEC: 291 MOSM/KG (ref 275–295)
OSMOLALITY SERPL CALC.SUM OF ELEC: 291 MOSM/KG (ref 275–295)
OSMOLALITY SERPL CALC.SUM OF ELEC: 292 MOSM/KG (ref 275–295)
OSMOLALITY SERPL CALC.SUM OF ELEC: 293 MOSM/KG (ref 275–295)
OSMOLALITY SERPL CALC.SUM OF ELEC: 295 MOSM/KG (ref 275–295)
OSMOLALITY SERPL CALC.SUM OF ELEC: 295 MOSM/KG (ref 275–295)
OSMOLALITY SERPL CALC.SUM OF ELEC: 296 MOSM/KG (ref 275–295)
OSMOLALITY SERPL CALC.SUM OF ELEC: 297 MOSM/KG (ref 275–295)
P AXIS: 49 DEGREES
P-R INTERVAL: 218 MS
PATIENT TEMPERATURE: 98 F
PATIENT TEMPERATURE: 98.6 F
PH UR STRIP.AUTO: 5 [PH] (ref 4.5–8)
PH UR STRIP.AUTO: 5 [PH] (ref 4.5–8)
PHENYTOIN (DILANTIN): 10.1 UG/ML (ref 10–20)
PHENYTOIN (DILANTIN): 3.9 UG/ML (ref 10–20)
PHENYTOIN (DILANTIN): 5.9 UG/ML (ref 10–20)
PHENYTOIN (DILANTIN): 6.6 UG/ML (ref 10–20)
PHOSPHATE SERPL-MCNC: 1.4 MG/DL (ref 2.5–4.9)
PHOSPHATE SERPL-MCNC: 2.6 MG/DL (ref 2.5–4.9)
PHOSPHATE SERPL-MCNC: 3.6 MG/DL (ref 2.5–4.9)
PHOSPHATE SERPL-MCNC: 4.2 MG/DL (ref 2.5–4.9)
PHOSPHATE SERPL-MCNC: 4.8 MG/DL (ref 2.5–4.9)
PLATELET # BLD AUTO: 101 10(3)UL (ref 150–450)
PLATELET # BLD AUTO: 106 10(3)UL (ref 150–450)
PLATELET # BLD AUTO: 113 10(3)UL (ref 150–450)
PLATELET # BLD AUTO: 22 10(3)UL (ref 150–450)
PLATELET # BLD AUTO: 23 10(3)UL (ref 150–450)
PLATELET # BLD AUTO: 23 10(3)UL (ref 150–450)
PLATELET # BLD AUTO: 26 10(3)UL (ref 150–450)
PLATELET # BLD AUTO: 27 10(3)UL (ref 150–450)
PLATELET # BLD AUTO: 29 10(3)UL (ref 150–450)
PLATELET # BLD AUTO: 29 10(3)UL (ref 150–450)
PLATELET # BLD AUTO: 30 10(3)UL (ref 150–450)
PLATELET # BLD AUTO: 35 10(3)UL (ref 150–450)
PLATELET # BLD AUTO: 37 10(3)UL (ref 150–450)
PLATELET # BLD AUTO: 38 10(3)UL (ref 150–450)
PLATELET # BLD AUTO: 50 10(3)UL (ref 150–450)
PLATELET # BLD AUTO: 75 10(3)UL (ref 150–450)
PLATELET # BLD AUTO: 77 10(3)UL (ref 150–450)
PLATELET # BLD AUTO: 86 10(3)UL (ref 150–450)
PLATELET # BLD AUTO: 95 10(3)UL (ref 150–450)
PLATELET MORPHOLOGY: NORMAL
PLEURAL FLUID PH: 7.46 (ref 7.2–7.5)
POTASSIUM BLOOD GAS: 4.2 MMOL/L (ref 3.6–5.1)
POTASSIUM SERPL-SCNC: 3.6 MMOL/L (ref 3.6–5.1)
POTASSIUM SERPL-SCNC: 3.8 MMOL/L (ref 3.6–5.1)
POTASSIUM SERPL-SCNC: 3.8 MMOL/L (ref 3.6–5.1)
POTASSIUM SERPL-SCNC: 3.9 MMOL/L (ref 3.6–5.1)
POTASSIUM SERPL-SCNC: 4 MMOL/L (ref 3.6–5.1)
POTASSIUM SERPL-SCNC: 4.1 MMOL/L (ref 3.6–5.1)
POTASSIUM SERPL-SCNC: 4.2 MMOL/L (ref 3.6–5.1)
POTASSIUM SERPL-SCNC: 4.3 MMOL/L (ref 3.6–5.1)
POTASSIUM SERPL-SCNC: 4.4 MMOL/L (ref 3.6–5.1)
POTASSIUM SERPL-SCNC: 4.5 MMOL/L (ref 3.6–5.1)
POTASSIUM SERPL-SCNC: 4.6 MMOL/L (ref 3.6–5.1)
POTASSIUM SERPL-SCNC: 4.7 MMOL/L (ref 3.6–5.1)
POTASSIUM SERPL-SCNC: 5.2 MMOL/L (ref 3.6–5.1)
POTASSIUM SERPL-SCNC: 5.4 MMOL/L (ref 3.6–5.1)
POTASSIUM SERPL-SCNC: 5.6 MMOL/L (ref 3.6–5.1)
POTASSIUM SERPL-SCNC: 5.6 MMOL/L (ref 3.6–5.1)
POTASSIUM SERPL-SCNC: 5.7 MMOL/L (ref 3.6–5.1)
POTASSIUM SERPL-SCNC: 5.9 MMOL/L (ref 3.6–5.1)
POTASSIUM SERPL-SCNC: 6.2 MMOL/L (ref 3.6–5.1)
PRO-BETA NATRIURETIC PEPTIDE: 3159 PG/ML (ref ?–450)
PROCALCITONIN SERPL-MCNC: 0.29 NG/ML
PROT PLR-MCNC: 3.3 G/DL
PROT UR STRIP.AUTO-MCNC: 30 MG/DL
PROT UR STRIP.AUTO-MCNC: NEGATIVE MG/DL
PSA SERPL DL<=0.01 NG/ML-MCNC: 13.5 SECONDS (ref 12.4–14.7)
PSA SERPL DL<=0.01 NG/ML-MCNC: 14.8 SECONDS (ref 12.4–14.7)
PSA SERPL DL<=0.01 NG/ML-MCNC: 16.1 SECONDS (ref 12.4–14.7)
Q-T INTERVAL: 504 MS
QRS DURATION: 166 MS
QTC CALCULATION (BEZET): 498 MS
R AXIS: 56 DEGREES
RBC # BLD AUTO: 2.01 X10(6)UL (ref 3.8–5.8)
RBC # BLD AUTO: 2.18 X10(6)UL (ref 3.8–5.8)
RBC # BLD AUTO: 2.23 X10(6)UL (ref 3.8–5.8)
RBC # BLD AUTO: 2.29 X10(6)UL (ref 3.8–5.8)
RBC # BLD AUTO: 2.3 X10(6)UL (ref 3.8–5.8)
RBC # BLD AUTO: 2.32 X10(6)UL (ref 3.8–5.8)
RBC # BLD AUTO: 2.37 X10(6)UL (ref 3.8–5.8)
RBC # BLD AUTO: 2.5 X10(6)UL (ref 3.8–5.8)
RBC # BLD AUTO: 2.5 X10(6)UL (ref 3.8–5.8)
RBC # BLD AUTO: 2.52 X10(6)UL (ref 3.8–5.8)
RBC # BLD AUTO: 2.53 X10(6)UL (ref 3.8–5.8)
RBC # BLD AUTO: 2.56 X10(6)UL (ref 3.8–5.8)
RBC # BLD AUTO: 2.91 X10(6)UL (ref 3.8–5.8)
RBC # BLD AUTO: 3.16 X10(6)UL (ref 3.8–5.8)
RBC # BLD AUTO: 3.29 X10(6)UL (ref 3.8–5.8)
RBC # BLD AUTO: 3.3 X10(6)UL (ref 3.8–5.8)
RBC # BLD AUTO: 3.43 X10(6)UL (ref 3.8–5.8)
RBC # BLD AUTO: 3.46 X10(6)UL (ref 3.8–5.8)
RBC # BLD AUTO: 3.55 X10(6)UL (ref 3.8–5.8)
RBC # BLD AUTO: 3.68 X10(6)UL (ref 3.8–5.8)
RBC #/AREA URNS AUTO: >10 /HPF
RBC PLEURAL FLUID: NORMAL /MM3
RBC UR QL AUTO: NEGATIVE
RED CELL DISTRIBUTION WIDTH-SD: 45.1 FL (ref 35.1–46.3)
RED CELL DISTRIBUTION WIDTH-SD: 45.7 FL (ref 35.1–46.3)
RED CELL DISTRIBUTION WIDTH-SD: 46.5 FL (ref 35.1–46.3)
RED CELL DISTRIBUTION WIDTH-SD: 47.1 FL (ref 35.1–46.3)
RED CELL DISTRIBUTION WIDTH-SD: 47.4 FL (ref 35.1–46.3)
RED CELL DISTRIBUTION WIDTH-SD: 48.1 FL (ref 35.1–46.3)
RED CELL DISTRIBUTION WIDTH-SD: 48.5 FL (ref 35.1–46.3)
RED CELL DISTRIBUTION WIDTH-SD: 56.4 FL (ref 35.1–46.3)
RED CELL DISTRIBUTION WIDTH-SD: 58.3 FL (ref 35.1–46.3)
RED CELL DISTRIBUTION WIDTH-SD: 58.8 FL (ref 35.1–46.3)
RED CELL DISTRIBUTION WIDTH-SD: 58.9 FL (ref 35.1–46.3)
RED CELL DISTRIBUTION WIDTH-SD: 59 FL (ref 35.1–46.3)
RED CELL DISTRIBUTION WIDTH-SD: 62 FL (ref 35.1–46.3)
RED CELL DISTRIBUTION WIDTH-SD: 63.2 FL (ref 35.1–46.3)
RED CELL DISTRIBUTION WIDTH-SD: 63.4 FL (ref 35.1–46.3)
RED CELL DISTRIBUTION WIDTH-SD: 63.8 FL (ref 35.1–46.3)
RED CELL DISTRIBUTION WIDTH-SD: 63.9 FL (ref 35.1–46.3)
RED CELL DISTRIBUTION WIDTH-SD: 64.4 FL (ref 35.1–46.3)
RED CELL DISTRIBUTION WIDTH-SD: 64.8 FL (ref 35.1–46.3)
RED CELL DISTRIBUTION WIDTH-SD: 65.1 FL (ref 35.1–46.3)
RETIC ABS CALC AUTO: 17.9 X10(3) UL (ref 22.5–147.5)
RETIC IRF CALC: 0.18 RATIO (ref 0.09–0.3)
RETIC%: 0.8 % (ref 0.5–2.5)
RETICULOCYTE HEMOGLOBIN EQUIVALENT: 24.5 PG (ref 28.2–36.3)
RH BLOOD TYPE: POSITIVE
SODIUM BLOOD GAS: 136 MMOL/L (ref 136–144)
SODIUM SERPL-SCNC: 132 MMOL/L (ref 136–144)
SODIUM SERPL-SCNC: 134 MMOL/L (ref 136–144)
SODIUM SERPL-SCNC: 134 MMOL/L (ref 136–144)
SODIUM SERPL-SCNC: 135 MMOL/L (ref 136–144)
SODIUM SERPL-SCNC: 136 MMOL/L (ref 136–144)
SODIUM SERPL-SCNC: 137 MMOL/L (ref 136–144)
SODIUM SERPL-SCNC: 138 MMOL/L (ref 136–144)
SODIUM SERPL-SCNC: 139 MMOL/L (ref 136–144)
SODIUM SERPL-SCNC: 140 MMOL/L (ref 136–144)
SODIUM SERPL-SCNC: 36 MMOL/L
SP GR UR STRIP.AUTO: 1.02 (ref 1–1.03)
SP GR UR STRIP.AUTO: 1.02 (ref 1–1.03)
STREPTOCOCCUS PNEUMONIAE AG, U: NEGATIVE
T AXIS: 20 DEGREES
TOTAL CELLS COUNTED: 100
TOTAL HEMOGLOBIN: 5.4 G/DL (ref 12.6–17.4)
TOTAL HEMOGLOBIN: 8.5 G/DL (ref 12.6–17.4)
TRIGL SERPL-MCNC: 101 MG/DL (ref ?–150)
TROPONIN: <0.046 NG/ML (ref ?–0.05)
URATE SERPL-MCNC: 5.7 MG/DL (ref 2.4–8.7)
URATE SERPL-MCNC: 6.5 MG/DL (ref 2.4–8.7)
URATE SERPL-MCNC: 7.9 MG/DL (ref 2.4–8.7)
URATE SERPL-MCNC: 8.3 MG/DL (ref 2.4–8.7)
UROBILINOGEN UR STRIP.AUTO-MCNC: <2 MG/DL
UROBILINOGEN UR STRIP.AUTO-MCNC: <2 MG/DL
VENT RATE: 16 /MIN
VENT RATE: 16 /MIN
VENTRICULAR RATE: 59 BPM
VLDLC SERPL CALC-MCNC: 20 MG/DL (ref 5–40)
WBC # BLD AUTO: 11.3 X10(3) UL (ref 4–13)
WBC # BLD AUTO: 115.8 X10(3) UL (ref 4–13)
WBC # BLD AUTO: 128.4 X10(3) UL (ref 4–13)
WBC # BLD AUTO: 13.1 X10(3) UL (ref 4–13)
WBC # BLD AUTO: 14.4 X10(3) UL (ref 4–13)
WBC # BLD AUTO: 15.9 X10(3) UL (ref 4–13)
WBC # BLD AUTO: 158.9 X10(3) UL (ref 4–13)
WBC # BLD AUTO: 163.2 X10(3) UL (ref 4–13)
WBC # BLD AUTO: 17.9 X10(3) UL (ref 4–13)
WBC # BLD AUTO: 18.5 X10(3) UL (ref 4–13)
WBC # BLD AUTO: 23.9 X10(3) UL (ref 4–13)
WBC # BLD AUTO: 38.1 X10(3) UL (ref 4–13)
WBC # BLD AUTO: 38.7 X10(3) UL (ref 4–13)
WBC # BLD AUTO: 41 X10(3) UL (ref 4–13)
WBC # BLD AUTO: 41.1 X10(3) UL (ref 4–13)
WBC # BLD AUTO: 49.7 X10(3) UL (ref 4–13)
WBC # BLD AUTO: 60.8 X10(3) UL (ref 4–13)
WBC # BLD AUTO: 75.2 X10(3) UL (ref 4–13)
WBC # BLD AUTO: 8.6 X10(3) UL (ref 4–13)
WBC # BLD AUTO: 80.8 X10(3) UL (ref 4–13)
WBC PLEURAL FLUID: 2890 /MM3

## 2018-01-01 PROCEDURE — 99024 POSTOP FOLLOW-UP VISIT: CPT | Performed by: NEUROLOGICAL SURGERY

## 2018-01-01 PROCEDURE — 70450 CT HEAD/BRAIN W/O DYE: CPT | Performed by: OTHER

## 2018-01-01 PROCEDURE — 99284 EMERGENCY DEPT VISIT MOD MDM: CPT

## 2018-01-01 PROCEDURE — 85027 COMPLETE CBC AUTOMATED: CPT | Performed by: HOSPITALIST

## 2018-01-01 PROCEDURE — 83615 LACTATE (LD) (LDH) ENZYME: CPT | Performed by: INTERNAL MEDICINE

## 2018-01-01 PROCEDURE — 82310 ASSAY OF CALCIUM: CPT | Performed by: INTERNAL MEDICINE

## 2018-01-01 PROCEDURE — 94640 AIRWAY INHALATION TREATMENT: CPT

## 2018-01-01 PROCEDURE — 87205 SMEAR GRAM STAIN: CPT | Performed by: INTERNAL MEDICINE

## 2018-01-01 PROCEDURE — 30233R1 TRANSFUSION OF NONAUTOLOGOUS PLATELETS INTO PERIPHERAL VEIN, PERCUTANEOUS APPROACH: ICD-10-PCS | Performed by: HOSPITALIST

## 2018-01-01 PROCEDURE — 85027 COMPLETE CBC AUTOMATED: CPT | Performed by: INTERNAL MEDICINE

## 2018-01-01 PROCEDURE — 80053 COMPREHEN METABOLIC PANEL: CPT | Performed by: INTERNAL MEDICINE

## 2018-01-01 PROCEDURE — 85025 COMPLETE CBC W/AUTO DIFF WBC: CPT | Performed by: INTERNAL MEDICINE

## 2018-01-01 PROCEDURE — 85025 COMPLETE CBC W/AUTO DIFF WBC: CPT | Performed by: HOSPITALIST

## 2018-01-01 PROCEDURE — 00C40ZZ EXTIRPATION OF MATTER FROM INTRACRANIAL SUBDURAL SPACE, OPEN APPROACH: ICD-10-PCS | Performed by: NEUROLOGICAL SURGERY

## 2018-01-01 PROCEDURE — 85018 HEMOGLOBIN: CPT | Performed by: INTERNAL MEDICINE

## 2018-01-01 PROCEDURE — 0NU33KZ SUPPLEMENT RIGHT PARIETAL BONE WITH NONAUTOLOGOUS TISSUE SUBSTITUTE, PERCUTANEOUS APPROACH: ICD-10-PCS | Performed by: NEUROLOGICAL SURGERY

## 2018-01-01 PROCEDURE — 97535 SELF CARE MNGMENT TRAINING: CPT

## 2018-01-01 PROCEDURE — 84100 ASSAY OF PHOSPHORUS: CPT | Performed by: INTERNAL MEDICINE

## 2018-01-01 PROCEDURE — 70450 CT HEAD/BRAIN W/O DYE: CPT | Performed by: NEUROLOGICAL SURGERY

## 2018-01-01 PROCEDURE — 94664 DEMO&/EVAL PT USE INHALER: CPT

## 2018-01-01 PROCEDURE — 86901 BLOOD TYPING SEROLOGIC RH(D): CPT | Performed by: INTERNAL MEDICINE

## 2018-01-01 PROCEDURE — 88185 FLOWCYTOMETRY/TC ADD-ON: CPT | Performed by: INTERNAL MEDICINE

## 2018-01-01 PROCEDURE — 84550 ASSAY OF BLOOD/URIC ACID: CPT | Performed by: INTERNAL MEDICINE

## 2018-01-01 PROCEDURE — 82374 ASSAY BLOOD CARBON DIOXIDE: CPT | Performed by: INTERNAL MEDICINE

## 2018-01-01 PROCEDURE — 83735 ASSAY OF MAGNESIUM: CPT | Performed by: INTERNAL MEDICINE

## 2018-01-01 PROCEDURE — 85007 BL SMEAR W/DIFF WBC COUNT: CPT | Performed by: INTERNAL MEDICINE

## 2018-01-01 PROCEDURE — 97116 GAIT TRAINING THERAPY: CPT

## 2018-01-01 PROCEDURE — 85027 COMPLETE CBC AUTOMATED: CPT | Performed by: EMERGENCY MEDICINE

## 2018-01-01 PROCEDURE — 83010 ASSAY OF HAPTOGLOBIN QUANT: CPT | Performed by: INTERNAL MEDICINE

## 2018-01-01 PROCEDURE — 85018 HEMOGLOBIN: CPT | Performed by: HOSPITALIST

## 2018-01-01 PROCEDURE — 00N00ZZ RELEASE BRAIN, OPEN APPROACH: ICD-10-PCS | Performed by: NEUROLOGICAL SURGERY

## 2018-01-01 PROCEDURE — 86880 COOMBS TEST DIRECT: CPT | Performed by: INTERNAL MEDICINE

## 2018-01-01 PROCEDURE — 71045 X-RAY EXAM CHEST 1 VIEW: CPT | Performed by: INTERNAL MEDICINE

## 2018-01-01 PROCEDURE — 86920 COMPATIBILITY TEST SPIN: CPT

## 2018-01-01 PROCEDURE — 86850 RBC ANTIBODY SCREEN: CPT | Performed by: INTERNAL MEDICINE

## 2018-01-01 PROCEDURE — 82375 ASSAY CARBOXYHB QUANT: CPT | Performed by: INTERNAL MEDICINE

## 2018-01-01 PROCEDURE — 82800 BLOOD PH: CPT | Performed by: INTERNAL MEDICINE

## 2018-01-01 PROCEDURE — 85007 BL SMEAR W/DIFF WBC COUNT: CPT | Performed by: HOSPITALIST

## 2018-01-01 PROCEDURE — 80048 BASIC METABOLIC PNL TOTAL CA: CPT | Performed by: INTERNAL MEDICINE

## 2018-01-01 PROCEDURE — 00C43ZZ EXTIRPATION OF MATTER FROM INTRACRANIAL SUBDURAL SPACE, PERCUTANEOUS APPROACH: ICD-10-PCS | Performed by: NEUROLOGICAL SURGERY

## 2018-01-01 PROCEDURE — 88342 IMHCHEM/IMCYTCHM 1ST ANTB: CPT | Performed by: INTERNAL MEDICINE

## 2018-01-01 PROCEDURE — 30233N1 TRANSFUSION OF NONAUTOLOGOUS RED BLOOD CELLS INTO PERIPHERAL VEIN, PERCUTANEOUS APPROACH: ICD-10-PCS | Performed by: HOSPITALIST

## 2018-01-01 PROCEDURE — 81003 URINALYSIS AUTO W/O SCOPE: CPT | Performed by: INTERNAL MEDICINE

## 2018-01-01 PROCEDURE — 80051 ELECTROLYTE PANEL: CPT | Performed by: INTERNAL MEDICINE

## 2018-01-01 PROCEDURE — 70450 CT HEAD/BRAIN W/O DYE: CPT | Performed by: EMERGENCY MEDICINE

## 2018-01-01 PROCEDURE — 71045 X-RAY EXAM CHEST 1 VIEW: CPT | Performed by: EMERGENCY MEDICINE

## 2018-01-01 PROCEDURE — 82803 BLOOD GASES ANY COMBINATION: CPT | Performed by: INTERNAL MEDICINE

## 2018-01-01 PROCEDURE — 80185 ASSAY OF PHENYTOIN TOTAL: CPT | Performed by: INTERNAL MEDICINE

## 2018-01-01 PROCEDURE — 97161 PT EVAL LOW COMPLEX 20 MIN: CPT

## 2018-01-01 PROCEDURE — 85610 PROTHROMBIN TIME: CPT | Performed by: INTERNAL MEDICINE

## 2018-01-01 PROCEDURE — 71046 X-RAY EXAM CHEST 2 VIEWS: CPT | Performed by: INTERNAL MEDICINE

## 2018-01-01 PROCEDURE — 97530 THERAPEUTIC ACTIVITIES: CPT

## 2018-01-01 PROCEDURE — 88341 IMHCHEM/IMCYTCHM EA ADD ANTB: CPT | Performed by: INTERNAL MEDICINE

## 2018-01-01 PROCEDURE — 84295 ASSAY OF SERUM SODIUM: CPT | Performed by: INTERNAL MEDICINE

## 2018-01-01 PROCEDURE — 84132 ASSAY OF SERUM POTASSIUM: CPT | Performed by: INTERNAL MEDICINE

## 2018-01-01 PROCEDURE — 84145 PROCALCITONIN (PCT): CPT | Performed by: INTERNAL MEDICINE

## 2018-01-01 PROCEDURE — 86900 BLOOD TYPING SEROLOGIC ABO: CPT | Performed by: INTERNAL MEDICINE

## 2018-01-01 PROCEDURE — 84157 ASSAY OF PROTEIN OTHER: CPT | Performed by: INTERNAL MEDICINE

## 2018-01-01 PROCEDURE — 99291 CRITICAL CARE FIRST HOUR: CPT | Performed by: OTHER

## 2018-01-01 PROCEDURE — 36430 TRANSFUSION BLD/BLD COMPNT: CPT

## 2018-01-01 PROCEDURE — 80185 ASSAY OF PHENYTOIN TOTAL: CPT | Performed by: EMERGENCY MEDICINE

## 2018-01-01 PROCEDURE — 85025 COMPLETE CBC W/AUTO DIFF WBC: CPT | Performed by: EMERGENCY MEDICINE

## 2018-01-01 PROCEDURE — 97110 THERAPEUTIC EXERCISES: CPT

## 2018-01-01 PROCEDURE — 80048 BASIC METABOLIC PNL TOTAL CA: CPT | Performed by: HOSPITALIST

## 2018-01-01 PROCEDURE — 94003 VENT MGMT INPAT SUBQ DAY: CPT

## 2018-01-01 PROCEDURE — 80048 BASIC METABOLIC PNL TOTAL CA: CPT | Performed by: EMERGENCY MEDICINE

## 2018-01-01 PROCEDURE — 36600 WITHDRAWAL OF ARTERIAL BLOOD: CPT | Performed by: INTERNAL MEDICINE

## 2018-01-01 PROCEDURE — 83605 ASSAY OF LACTIC ACID: CPT | Performed by: INTERNAL MEDICINE

## 2018-01-01 PROCEDURE — 82570 ASSAY OF URINE CREATININE: CPT | Performed by: INTERNAL MEDICINE

## 2018-01-01 PROCEDURE — 93306 TTE W/DOPPLER COMPLETE: CPT | Performed by: HOSPITALIST

## 2018-01-01 PROCEDURE — 97166 OT EVAL MOD COMPLEX 45 MIN: CPT

## 2018-01-01 PROCEDURE — 0W993ZZ DRAINAGE OF RIGHT PLEURAL CAVITY, PERCUTANEOUS APPROACH: ICD-10-PCS | Performed by: INTERNAL MEDICINE

## 2018-01-01 PROCEDURE — 87899 AGENT NOS ASSAY W/OPTIC: CPT | Performed by: INTERNAL MEDICINE

## 2018-01-01 PROCEDURE — 88184 FLOWCYTOMETRY/ TC 1 MARKER: CPT | Performed by: INTERNAL MEDICINE

## 2018-01-01 PROCEDURE — 88305 TISSUE EXAM BY PATHOLOGIST: CPT | Performed by: INTERNAL MEDICINE

## 2018-01-01 PROCEDURE — 99024 POSTOP FOLLOW-UP VISIT: CPT | Performed by: PHYSICIAN ASSISTANT

## 2018-01-01 PROCEDURE — 85045 AUTOMATED RETICULOCYTE COUNT: CPT | Performed by: INTERNAL MEDICINE

## 2018-01-01 PROCEDURE — 82330 ASSAY OF CALCIUM: CPT | Performed by: INTERNAL MEDICINE

## 2018-01-01 PROCEDURE — 70450 CT HEAD/BRAIN W/O DYE: CPT | Performed by: PHYSICIAN ASSISTANT

## 2018-01-01 PROCEDURE — 84300 ASSAY OF URINE SODIUM: CPT | Performed by: INTERNAL MEDICINE

## 2018-01-01 PROCEDURE — 83050 HGB METHEMOGLOBIN QUAN: CPT | Performed by: INTERNAL MEDICINE

## 2018-01-01 PROCEDURE — 88108 CYTOPATH CONCENTRATE TECH: CPT | Performed by: INTERNAL MEDICINE

## 2018-01-01 PROCEDURE — 96361 HYDRATE IV INFUSION ADD-ON: CPT

## 2018-01-01 PROCEDURE — 89051 BODY FLUID CELL COUNT: CPT | Performed by: INTERNAL MEDICINE

## 2018-01-01 PROCEDURE — 83880 ASSAY OF NATRIURETIC PEPTIDE: CPT | Performed by: INTERNAL MEDICINE

## 2018-01-01 PROCEDURE — 99233 SBSQ HOSP IP/OBS HIGH 50: CPT | Performed by: OTHER

## 2018-01-01 PROCEDURE — 94660 CPAP INITIATION&MGMT: CPT

## 2018-01-01 PROCEDURE — 85652 RBC SED RATE AUTOMATED: CPT | Performed by: EMERGENCY MEDICINE

## 2018-01-01 PROCEDURE — 87070 CULTURE OTHR SPECIMN AEROBIC: CPT | Performed by: INTERNAL MEDICINE

## 2018-01-01 PROCEDURE — 74018 RADEX ABDOMEN 1 VIEW: CPT | Performed by: INTERNAL MEDICINE

## 2018-01-01 PROCEDURE — 99232 SBSQ HOSP IP/OBS MODERATE 35: CPT | Performed by: NEUROLOGICAL SURGERY

## 2018-01-01 PROCEDURE — 94002 VENT MGMT INPAT INIT DAY: CPT

## 2018-01-01 PROCEDURE — 82945 GLUCOSE OTHER FLUID: CPT | Performed by: INTERNAL MEDICINE

## 2018-01-01 PROCEDURE — 86706 HEP B SURFACE ANTIBODY: CPT | Performed by: PHYSICIAN ASSISTANT

## 2018-01-01 PROCEDURE — 89050 BODY FLUID CELL COUNT: CPT | Performed by: INTERNAL MEDICINE

## 2018-01-01 PROCEDURE — 32555 ASPIRATE PLEURA W/ IMAGING: CPT | Performed by: INTERNAL MEDICINE

## 2018-01-01 PROCEDURE — 85007 BL SMEAR W/DIFF WBC COUNT: CPT | Performed by: EMERGENCY MEDICINE

## 2018-01-01 PROCEDURE — 71045 X-RAY EXAM CHEST 1 VIEW: CPT | Performed by: HOSPITALIST

## 2018-01-01 PROCEDURE — 96360 HYDRATION IV INFUSION INIT: CPT

## 2018-01-01 PROCEDURE — 86704 HEP B CORE ANTIBODY TOTAL: CPT | Performed by: PHYSICIAN ASSISTANT

## 2018-01-01 PROCEDURE — 87449 NOS EACH ORGANISM AG IA: CPT | Performed by: INTERNAL MEDICINE

## 2018-01-01 PROCEDURE — 85049 AUTOMATED PLATELET COUNT: CPT | Performed by: INTERNAL MEDICINE

## 2018-01-01 DEVICE — OSTEOSPONGE CRANIAL DISC 14MM: Type: IMPLANTABLE DEVICE | Site: HEAD | Status: FUNCTIONAL

## 2018-01-01 RX ORDER — CEFAZOLIN SODIUM/WATER 2 G/20 ML
2 SYRINGE (ML) INTRAVENOUS EVERY 8 HOURS
Status: COMPLETED | OUTPATIENT
Start: 2018-01-01 | End: 2018-01-01

## 2018-01-01 RX ORDER — LIDOCAINE HYDROCHLORIDE AND EPINEPHRINE 10; 10 MG/ML; UG/ML
INJECTION, SOLUTION INFILTRATION; PERINEURAL AS NEEDED
Status: DISCONTINUED | OUTPATIENT
Start: 2018-01-01 | End: 2018-01-01 | Stop reason: HOSPADM

## 2018-01-01 RX ORDER — ACETAMINOPHEN 325 MG/1
650 TABLET ORAL ONCE
Status: COMPLETED | OUTPATIENT
Start: 2018-01-01 | End: 2018-01-01

## 2018-01-01 RX ORDER — DIPHENHYDRAMINE HCL 25 MG
25 CAPSULE ORAL ONCE
Status: COMPLETED | OUTPATIENT
Start: 2018-01-01 | End: 2018-01-01

## 2018-01-01 RX ORDER — GABAPENTIN 400 MG/1
800 CAPSULE ORAL 4 TIMES DAILY
Status: DISCONTINUED | OUTPATIENT
Start: 2018-01-01 | End: 2018-01-01

## 2018-01-01 RX ORDER — SODIUM POLYSTYRENE SULFONATE 15 G/60ML
15 SUSPENSION ORAL; RECTAL ONCE
Status: COMPLETED | OUTPATIENT
Start: 2018-01-01 | End: 2018-01-01

## 2018-01-01 RX ORDER — BUTALBITAL, ACETAMINOPHEN AND CAFFEINE 50; 325; 40 MG/1; MG/1; MG/1
1 TABLET ORAL EVERY 4 HOURS PRN
Status: DISCONTINUED | OUTPATIENT
Start: 2018-01-01 | End: 2018-01-01

## 2018-01-01 RX ORDER — SODIUM POLYSTYRENE SULFONATE 15 G/60ML
30 SUSPENSION ORAL; RECTAL ONCE
Status: COMPLETED | OUTPATIENT
Start: 2018-01-01 | End: 2018-01-01

## 2018-01-01 RX ORDER — AMITRIPTYLINE HYDROCHLORIDE 75 MG/1
75 TABLET, FILM COATED ORAL NIGHTLY
Qty: 30 TABLET | Refills: 0 | Status: SHIPPED | OUTPATIENT
Start: 2018-01-01 | End: 2018-01-01 | Stop reason: DRUGHIGH

## 2018-01-01 RX ORDER — FINASTERIDE 5 MG/1
5 TABLET, FILM COATED ORAL DAILY
Status: DISCONTINUED | OUTPATIENT
Start: 2018-01-01 | End: 2018-01-01

## 2018-01-01 RX ORDER — HYDRALAZINE HYDROCHLORIDE 20 MG/ML
10 INJECTION INTRAMUSCULAR; INTRAVENOUS EVERY 4 HOURS PRN
Status: DISCONTINUED | OUTPATIENT
Start: 2018-01-01 | End: 2018-01-01

## 2018-01-01 RX ORDER — SODIUM PHOSPHATE, DIBASIC AND SODIUM PHOSPHATE, MONOBASIC 7; 19 G/133ML; G/133ML
1 ENEMA RECTAL ONCE AS NEEDED
Status: DISCONTINUED | OUTPATIENT
Start: 2018-01-01 | End: 2018-01-01

## 2018-01-01 RX ORDER — DOCUSATE SODIUM 100 MG/1
100 CAPSULE, LIQUID FILLED ORAL 2 TIMES DAILY
Status: DISCONTINUED | OUTPATIENT
Start: 2018-01-01 | End: 2018-01-01

## 2018-01-01 RX ORDER — AMITRIPTYLINE HYDROCHLORIDE 50 MG/1
50 TABLET, FILM COATED ORAL EVERY MORNING
Status: ON HOLD | COMMUNITY
End: 2018-01-01

## 2018-01-01 RX ORDER — FAMOTIDINE 10 MG/ML
20 INJECTION, SOLUTION INTRAVENOUS 2 TIMES DAILY
Status: DISCONTINUED | OUTPATIENT
Start: 2018-01-01 | End: 2018-01-01

## 2018-01-01 RX ORDER — ATORVASTATIN CALCIUM 40 MG/1
40 TABLET, FILM COATED ORAL NIGHTLY
COMMUNITY

## 2018-01-01 RX ORDER — CLOPIDOGREL BISULFATE 75 MG/1
75 TABLET ORAL DAILY
Status: ON HOLD | COMMUNITY
End: 2018-01-01

## 2018-01-01 RX ORDER — ACETAMINOPHEN 325 MG/1
650 TABLET ORAL EVERY 6 HOURS PRN
Status: DISCONTINUED | OUTPATIENT
Start: 2018-01-01 | End: 2018-01-01

## 2018-01-01 RX ORDER — PHENYTOIN SODIUM 100 MG/1
300 CAPSULE, EXTENDED RELEASE ORAL NIGHTLY
COMMUNITY
End: 2018-01-01

## 2018-01-01 RX ORDER — DEXTROSE MONOHYDRATE 25 G/50ML
50 INJECTION, SOLUTION INTRAVENOUS
Status: DISCONTINUED | OUTPATIENT
Start: 2018-01-01 | End: 2018-01-01

## 2018-01-01 RX ORDER — MORPHINE SULFATE 4 MG/ML
2 INJECTION, SOLUTION INTRAMUSCULAR; INTRAVENOUS EVERY 2 HOUR PRN
Status: DISCONTINUED | OUTPATIENT
Start: 2018-01-01 | End: 2018-01-01

## 2018-01-01 RX ORDER — PHENYTOIN SODIUM 100 MG/1
200 CAPSULE, EXTENDED RELEASE ORAL DAILY
Status: DISCONTINUED | OUTPATIENT
Start: 2018-01-01 | End: 2018-01-01

## 2018-01-01 RX ORDER — PHENYLEPHRINE HCL IN 0.9% NACL 50MG/250ML
PLASTIC BAG, INJECTION (ML) INTRAVENOUS CONTINUOUS PRN
Status: DISCONTINUED | OUTPATIENT
Start: 2018-01-01 | End: 2018-01-01

## 2018-01-01 RX ORDER — HYDRALAZINE HYDROCHLORIDE 20 MG/ML
10 INJECTION INTRAMUSCULAR; INTRAVENOUS
Status: DISCONTINUED | OUTPATIENT
Start: 2018-01-01 | End: 2018-01-01

## 2018-01-01 RX ORDER — FUROSEMIDE 10 MG/ML
40 INJECTION INTRAMUSCULAR; INTRAVENOUS
Status: COMPLETED | OUTPATIENT
Start: 2018-01-01 | End: 2018-01-01

## 2018-01-01 RX ORDER — LABETALOL HYDROCHLORIDE 5 MG/ML
10 INJECTION, SOLUTION INTRAVENOUS EVERY 10 MIN PRN
Status: DISCONTINUED | OUTPATIENT
Start: 2018-01-01 | End: 2018-01-01

## 2018-01-01 RX ORDER — ALLOPURINOL 300 MG/1
300 TABLET ORAL DAILY
Status: DISCONTINUED | OUTPATIENT
Start: 2018-01-01 | End: 2018-01-01

## 2018-01-01 RX ORDER — FUROSEMIDE 20 MG/1
20 TABLET ORAL DAILY
Status: DISCONTINUED | OUTPATIENT
Start: 2018-01-01 | End: 2018-01-01

## 2018-01-01 RX ORDER — BISACODYL 10 MG
10 SUPPOSITORY, RECTAL RECTAL
Status: DISCONTINUED | OUTPATIENT
Start: 2018-01-01 | End: 2018-01-01

## 2018-01-01 RX ORDER — FAMOTIDINE 20 MG/1
20 TABLET ORAL 2 TIMES DAILY
Status: DISCONTINUED | OUTPATIENT
Start: 2018-01-01 | End: 2018-01-01

## 2018-01-01 RX ORDER — GABAPENTIN 400 MG/1
800 CAPSULE ORAL 4 TIMES DAILY
COMMUNITY
End: 2018-01-01

## 2018-01-01 RX ORDER — SODIUM CHLORIDE 9 MG/ML
INJECTION, SOLUTION INTRAVENOUS ONCE
Status: COMPLETED | OUTPATIENT
Start: 2018-01-01 | End: 2018-01-01

## 2018-01-01 RX ORDER — BACITRACIN 50000 [USP'U]/1
INJECTION, POWDER, LYOPHILIZED, FOR SOLUTION INTRAMUSCULAR AS NEEDED
Status: DISCONTINUED | OUTPATIENT
Start: 2018-01-01 | End: 2018-01-01 | Stop reason: HOSPADM

## 2018-01-01 RX ORDER — PHENYTOIN SODIUM 100 MG/1
200 CAPSULE, EXTENDED RELEASE ORAL EVERY MORNING
Status: DISCONTINUED | OUTPATIENT
Start: 2018-01-01 | End: 2018-01-01

## 2018-01-01 RX ORDER — IPRATROPIUM BROMIDE AND ALBUTEROL SULFATE 2.5; .5 MG/3ML; MG/3ML
SOLUTION RESPIRATORY (INHALATION)
Status: DISPENSED
Start: 2018-01-01 | End: 2018-01-01

## 2018-01-01 RX ORDER — PHENYTOIN SODIUM 100 MG/1
200 CAPSULE, EXTENDED RELEASE ORAL EVERY MORNING
COMMUNITY
End: 2018-01-01

## 2018-01-01 RX ORDER — METHYLPREDNISOLONE SODIUM SUCCINATE 125 MG/2ML
125 INJECTION, POWDER, LYOPHILIZED, FOR SOLUTION INTRAMUSCULAR; INTRAVENOUS EVERY 8 HOURS
Status: COMPLETED | OUTPATIENT
Start: 2018-01-01 | End: 2018-01-01

## 2018-01-01 RX ORDER — PHENYTOIN SODIUM 100 MG/1
300 CAPSULE, EXTENDED RELEASE ORAL NIGHTLY
Status: DISCONTINUED | OUTPATIENT
Start: 2018-01-01 | End: 2018-01-01

## 2018-01-01 RX ORDER — PREDNISONE 20 MG/1
40 TABLET ORAL
Status: DISCONTINUED | OUTPATIENT
Start: 2018-01-01 | End: 2018-01-01

## 2018-01-01 RX ORDER — IPRATROPIUM BROMIDE AND ALBUTEROL SULFATE 2.5; .5 MG/3ML; MG/3ML
3 SOLUTION RESPIRATORY (INHALATION)
Status: DISCONTINUED | OUTPATIENT
Start: 2018-01-01 | End: 2018-01-01

## 2018-01-01 RX ORDER — SODIUM CHLORIDE 9 MG/ML
INJECTION, SOLUTION INTRAVENOUS CONTINUOUS
Status: DISCONTINUED | OUTPATIENT
Start: 2018-01-01 | End: 2018-01-01

## 2018-01-01 RX ORDER — SODIUM CHLORIDE AND POTASSIUM CHLORIDE .9; .15 G/100ML; G/100ML
SOLUTION INTRAVENOUS CONTINUOUS
Status: DISCONTINUED | OUTPATIENT
Start: 2018-01-01 | End: 2018-01-01

## 2018-01-01 RX ORDER — AMITRIPTYLINE HYDROCHLORIDE 25 MG/1
75 TABLET, FILM COATED ORAL NIGHTLY
Status: DISCONTINUED | OUTPATIENT
Start: 2018-01-01 | End: 2018-01-01

## 2018-01-01 RX ORDER — CALCIUM CARBONATE/VITAMIN D3 600 MG-10
1 TABLET ORAL DAILY
COMMUNITY

## 2018-01-01 RX ORDER — ONDANSETRON 2 MG/ML
4 INJECTION INTRAMUSCULAR; INTRAVENOUS EVERY 6 HOURS PRN
Status: DISCONTINUED | OUTPATIENT
Start: 2018-01-01 | End: 2018-01-01

## 2018-01-01 RX ORDER — CARBAMAZEPINE 200 MG/1
200 TABLET ORAL ONCE
Status: COMPLETED | OUTPATIENT
Start: 2018-01-01 | End: 2018-01-01

## 2018-01-01 RX ORDER — DIPHENHYDRAMINE HCL 25 MG
25 CAPSULE ORAL EVERY 6 HOURS PRN
Status: DISCONTINUED | OUTPATIENT
Start: 2018-01-01 | End: 2018-01-01

## 2018-01-01 RX ORDER — ACETAMINOPHEN 650 MG/1
650 SUPPOSITORY RECTAL EVERY 4 HOURS PRN
Status: DISCONTINUED | OUTPATIENT
Start: 2018-01-01 | End: 2018-01-01

## 2018-01-01 RX ORDER — FAMOTIDINE 20 MG/1
20 TABLET ORAL DAILY
Status: DISCONTINUED | OUTPATIENT
Start: 2018-01-01 | End: 2018-01-01

## 2018-01-01 RX ORDER — DOCUSATE SODIUM 100 MG/1
100 CAPSULE, LIQUID FILLED ORAL DAILY PRN
COMMUNITY

## 2018-01-01 RX ORDER — MELATONIN
1000 DAILY
COMMUNITY

## 2018-01-01 RX ORDER — SODIUM POLYSTYRENE SULFONATE 15 G/60ML
15 SUSPENSION ORAL; RECTAL ONCE
Status: DISCONTINUED | OUTPATIENT
Start: 2018-01-01 | End: 2018-01-01

## 2018-01-01 RX ORDER — PHENYTOIN SODIUM 50 MG/ML
200 INJECTION, SOLUTION INTRAMUSCULAR; INTRAVENOUS
Status: DISCONTINUED | OUTPATIENT
Start: 2018-01-01 | End: 2018-01-01

## 2018-01-01 RX ORDER — ACETAMINOPHEN 325 MG/1
650 TABLET ORAL EVERY 4 HOURS PRN
Qty: 30 TABLET | Refills: 0 | Status: SHIPPED | OUTPATIENT
Start: 2018-01-01

## 2018-01-01 RX ORDER — ARIPIPRAZOLE 15 MG/1
40 TABLET ORAL EVERY 4 HOURS
Status: COMPLETED | OUTPATIENT
Start: 2018-01-01 | End: 2018-01-01

## 2018-01-01 RX ORDER — LEVOFLOXACIN 5 MG/ML
750 INJECTION, SOLUTION INTRAVENOUS
Status: DISCONTINUED | OUTPATIENT
Start: 2018-01-01 | End: 2018-01-01

## 2018-01-01 RX ORDER — ASPIRIN 81 MG/1
81 TABLET ORAL DAILY
Status: ON HOLD | COMMUNITY
End: 2018-01-01

## 2018-01-01 RX ORDER — MORPHINE SULFATE 4 MG/ML
4 INJECTION, SOLUTION INTRAMUSCULAR; INTRAVENOUS EVERY 2 HOUR PRN
Status: DISCONTINUED | OUTPATIENT
Start: 2018-01-01 | End: 2018-01-01

## 2018-01-01 RX ORDER — SENNOSIDES 8.6 MG
17.2 TABLET ORAL NIGHTLY
Status: DISCONTINUED | OUTPATIENT
Start: 2018-01-01 | End: 2018-01-01

## 2018-01-01 RX ORDER — AMITRIPTYLINE HYDROCHLORIDE 50 MG/1
100 TABLET, FILM COATED ORAL NIGHTLY
Status: DISCONTINUED | OUTPATIENT
Start: 2018-01-01 | End: 2018-01-01

## 2018-01-01 RX ORDER — METOCLOPRAMIDE HYDROCHLORIDE 5 MG/ML
10 INJECTION INTRAMUSCULAR; INTRAVENOUS EVERY 8 HOURS PRN
Status: DISCONTINUED | OUTPATIENT
Start: 2018-01-01 | End: 2018-01-01

## 2018-01-01 RX ORDER — ACETAMINOPHEN 325 MG/1
650 TABLET ORAL EVERY 4 HOURS PRN
Status: DISCONTINUED | OUTPATIENT
Start: 2018-01-01 | End: 2018-01-01

## 2018-01-01 RX ORDER — POLYETHYLENE GLYCOL 3350 17 G/17G
17 POWDER, FOR SOLUTION ORAL DAILY
Status: DISCONTINUED | OUTPATIENT
Start: 2018-01-01 | End: 2018-01-01

## 2018-01-01 RX ORDER — FUROSEMIDE 10 MG/ML
40 INJECTION INTRAMUSCULAR; INTRAVENOUS ONCE
Status: DISCONTINUED | OUTPATIENT
Start: 2018-01-01 | End: 2018-01-01

## 2018-01-01 RX ORDER — POLYETHYLENE GLYCOL 3350 17 G/17G
17 POWDER, FOR SOLUTION ORAL DAILY PRN
Status: DISCONTINUED | OUTPATIENT
Start: 2018-01-01 | End: 2018-01-01

## 2018-01-01 RX ORDER — AMITRIPTYLINE HYDROCHLORIDE 50 MG/1
50 TABLET, FILM COATED ORAL EVERY MORNING
COMMUNITY

## 2018-01-01 RX ORDER — ATENOLOL 50 MG/1
75 TABLET ORAL DAILY
COMMUNITY

## 2018-01-01 RX ORDER — FUROSEMIDE 10 MG/ML
40 INJECTION INTRAMUSCULAR; INTRAVENOUS ONCE
Status: COMPLETED | OUTPATIENT
Start: 2018-01-01 | End: 2018-01-01

## 2018-01-01 RX ORDER — ATORVASTATIN CALCIUM 40 MG/1
40 TABLET, FILM COATED ORAL NIGHTLY
Status: DISCONTINUED | OUTPATIENT
Start: 2018-01-01 | End: 2018-01-01

## 2018-01-01 RX ORDER — HYDROCODONE BITARTRATE AND ACETAMINOPHEN 5; 325 MG/1; MG/1
1 TABLET ORAL EVERY 4 HOURS PRN
Status: DISCONTINUED | OUTPATIENT
Start: 2018-01-01 | End: 2018-01-01

## 2018-01-01 RX ORDER — CARBAMAZEPINE 200 MG/1
200 TABLET ORAL 2 TIMES DAILY
Qty: 20 TABLET | Refills: 0 | Status: SHIPPED | OUTPATIENT
Start: 2018-01-01

## 2018-01-01 RX ORDER — IPRATROPIUM BROMIDE AND ALBUTEROL SULFATE 2.5; .5 MG/3ML; MG/3ML
3 SOLUTION RESPIRATORY (INHALATION) EVERY 4 HOURS PRN
Status: DISCONTINUED | OUTPATIENT
Start: 2018-01-01 | End: 2018-01-01

## 2018-01-01 RX ORDER — HYDROCODONE BITARTRATE AND ACETAMINOPHEN 5; 325 MG/1; MG/1
2 TABLET ORAL EVERY 4 HOURS PRN
Status: DISCONTINUED | OUTPATIENT
Start: 2018-01-01 | End: 2018-01-01

## 2018-01-01 RX ORDER — PREDNISONE 10 MG/1
TABLET ORAL
Qty: 30 TABLET | Refills: 0 | Status: SHIPPED | OUTPATIENT
Start: 2018-01-01 | End: 2018-01-01

## 2018-01-01 RX ORDER — LABETALOL HYDROCHLORIDE 5 MG/ML
10 INJECTION, SOLUTION INTRAVENOUS AS NEEDED
Status: DISCONTINUED | OUTPATIENT
Start: 2018-01-01 | End: 2018-01-01

## 2018-01-01 RX ORDER — LEVOFLOXACIN 5 MG/ML
750 INJECTION, SOLUTION INTRAVENOUS EVERY 24 HOURS
Status: COMPLETED | OUTPATIENT
Start: 2018-01-01 | End: 2018-01-01

## 2018-01-01 RX ORDER — SODIUM CHLORIDE 9 MG/ML
125 INJECTION, SOLUTION INTRAVENOUS CONTINUOUS
Status: DISCONTINUED | OUTPATIENT
Start: 2018-01-01 | End: 2018-01-01

## 2018-01-01 RX ORDER — GARLIC EXTRACT 500 MG
1 CAPSULE ORAL DAILY
COMMUNITY

## 2018-01-01 RX ORDER — ACETAMINOPHEN 10 MG/ML
1000 INJECTION, SOLUTION INTRAVENOUS EVERY 6 HOURS PRN
Status: DISPENSED | OUTPATIENT
Start: 2018-01-01 | End: 2018-01-01

## 2018-01-01 RX ORDER — MORPHINE SULFATE 4 MG/ML
1 INJECTION, SOLUTION INTRAMUSCULAR; INTRAVENOUS EVERY 2 HOUR PRN
Status: DISCONTINUED | OUTPATIENT
Start: 2018-01-01 | End: 2018-01-01

## 2018-01-01 RX ORDER — FAMOTIDINE 10 MG/ML
20 INJECTION, SOLUTION INTRAVENOUS DAILY
Status: DISCONTINUED | OUTPATIENT
Start: 2018-01-01 | End: 2018-01-01

## 2018-07-03 PROBLEM — S06.5X9A ACUTE SUBDURAL HEMATOMA (HCC): Status: ACTIVE | Noted: 2018-01-01

## 2018-07-03 NOTE — CONSULTS
[facility]  Neuro Critical Care Consult Note    Verena Khalil Patient Status:  Inpatient    10/9/1935 MRN [id number]   [facility] 6NE-A Attending Debby Wilkinson MD   Hosp Day # 0 PCP Alisa Brenner MD     Date of Admission: 7/3/2018 SURGICAL PROSTATECTOMY, RETROPUBI*      Comment: laparoscopic  No date: OTHER      Comment: right carotid stent  No date: OTHER      Comment: leg stent  No date: OTHER      Comment: lumbar discectomy  10/5/09: [facility] CATARACT EXTRACAP,INSERT LENS      Commen icterus. No conjunctival hemorrhage. Neck: Soft, supple neck; trachea midline. Lungs: Clear to auscultation bilaterally. No wheezes or crackles. Heart: Regular rate and rhythm, normal S1S2, no murmur.   Abdomen: Soft, non-tender, non-distended, no vivian goal hemoglobin more than 7.  -Afebrile reactive leukocytosis. Culture if fever more than 100.5°F.    Skin:  -Monitor for skin breakdown. Endocrine:  -Hyperglycemia protocol.     A total of 45 minutes of critical care time (exclusive of billable procedu

## 2018-07-03 NOTE — CONSULTS
[facility] Neurosurgery Consultation Note    Sonya Dill Patient Status:  Emergency    10/9/1935 MRN [id number]   Location [facility] Attending Saleem Rodriguez MD   Hosp Day # 0 PCP Rand Turcios MD     REASON FOR C CATARACT      Comment: od 10yrs ago  No date: LAPAROSCOPY, SURGICAL PROSTATECTOMY, RETROPUBI*      Comment: laparoscopic  No date: OTHER      Comment: right carotid stent  No date: OTHER      Comment: leg stent  No date: OTHER      Comment: lumbar discecto Current Facility-Administered Medications:  levETIRAcetam (KEPPRA) 1,000 mg in sodium chloride 0.9 % 100 mL IVPB 1,000 mg Intravenous Once       REVIEW OF SYSTEMS:    Limited review with son      PHYSICAL EXAMINATION:  VITAL SIGNS: /70   Pulse mm in thickness. There is 9 mm of leftward   midline shift. Continued imaging followup is recommended.     ASSESSMENT:    This is a [de-identified]year old male s/p fall with acute SDH, left 9 mm shift     PLAN:    Admit to [facility]  ICH order set  AED per NeuroCC  Ma

## 2018-07-03 NOTE — ED PROVIDER NOTES
Patient Seen in: [facility] Emergency Department    History   Patient presents with:  Trauma 1 & 2 (cardiovascular, musculoskeletal)    Stated Complaint: fall; ams    HPI    82-year-old male presents emergency department who apparently had fallen as Smoker                                                              Packs/day: 0.25      Years: 0.00         Types: Cigarettes     Start date: 1/19/1953     Quit date: 9/19/2014  Smokeless tobacco: Never Used                      Comment: using electronic Notable for the following:     Lymphocyte Absolute Manual 6.55 (*)     All other components within normal limits   LIPID PANEL - Abnormal; Notable for the following:     HDL Cholesterol 33 (*)     All other components within normal limits   PHENYTOIN (DILA RAINBOW DRAW BLUE   RAINBOW DRAW LAVENDER   RAINBOW DRAW LIGHT GREEN   RAINBOW DRAW GOLD   MRSA CULTURE ONLY   MRSA CULTURE ONLY     EKG    Rate, intervals and axes as noted on EKG Report.   Rate: 59  Rhythm: Sinus Rhythm  Reading: Sinus bradycardia with maintaining airway without difficulty and is able to answer questions. Patient was seen immediately upon arrival due to a high probability of sudden, clinically significant, or life threatening deterioration of the patient's clinical status.   The patien

## 2018-07-03 NOTE — ED INITIAL ASSESSMENT (HPI)
Pt's son reports pt fell forward off bed at 0800. States pt states he fell asleep on bed and fell forward. Pt on plavix. Son reports he may have his his head on dresser. Hx TBI.

## 2018-07-04 NOTE — PHYSICAL THERAPY NOTE
PHYSICAL THERAPY EVALUATION - INPATIENT     Room Number: 6949/4598-T  Evaluation Date: 7/4/2018  Type of Evaluation: Initial  Physician Order: PT Eval and Treat    Presenting Problem: Acute subdural hematoma  Reason for Therapy: Mobility Dysfunction an History  Past Surgical History:  No date: CABG  2000: CABG, ARTERIAL, SINGLE  2000: CAROTID ENDARTERECTOMY      Comment: right  No date: CATARACT      Comment: od 10yrs ago  No date: LAPAROSCOPY, SURGICAL PROSTATECTOMY, RETROPUBI*      Comment: laparoscopi repetition  · Motor Planning: impaired  · unable to count backwards from 20 and unable to understand the request.      RANGE OF MOTION AND STRENGTH ASSESSMENT  Upper extremity ROM and strength = See OT eval to completed.        Lower extremity ROM is within Left = indep  Supine to Sit = mod indep  Sit to supine = mod indep    Transfers  Sit to stand = min assist  Stand to sit = min assist      Skilled Therapy Provided: Above functional ability scores are FIM defined scores per dept policy.      Mobility as ind below baseline and would benefit from skilled inpatient PT to address the above deficits to assist patient in returning to prior level of function. Subacute rehab is recommended for 13-16 days.   After this period of rehabilitation patient should achieve

## 2018-07-04 NOTE — PROGRESS NOTES
07/03/18 4277   Clinical Encounter Type   Visited With Patient and family together  (Wife and extended family at bedside)   Routine Visit Introduction   Continue Visiting No   Patient's Supportive Strategies/Resources  provided emotional support

## 2018-07-04 NOTE — PLAN OF CARE
Patient to room 6601 from ER. Drowsy, easily aroused. Oriented x4. Slight left sided upper extremity weakness with left tricep, left drift. Patients' speech slightly slurred. Patient's dentures not staying in place so removed and soaking.  While patient mindy

## 2018-07-04 NOTE — H&P
[facility] hospitalist H+P    PCP; Armand Ring MD  CC fall  HPI [de-identified] yo male with multiple medical problems incluidng but not limited to CAD, DM2, HTN, COPD, seizures,  cancer, here s/p hitting a dresser while rolling out of bed, diagnosed with Acute subdural h Hypertension Son    • Diabetes Son        Social History  Social History   Marital status:   Spouse name: N/A    Years of education: N/A  Number of children: N/A     Occupational History  None on file     Social History Main Topics   Smoking status: hr on 7/3/2018. Critical results were read back.         Assessment/plan [de-identified] yo male with multiple medical problems incluidng but not limited to CAD, DM2, HTN, COPD, seizures,  cancer, here s/p hitting a dresser while rolling out of bed, diagnosed with Acute

## 2018-07-04 NOTE — PROGRESS NOTES
Assumed care of patient at 0730. Patient alert and oriented X 4. Neurologically intact except for slight delay in response to questions.   Ct in am was stable and patient was placed on neuro checks every two hours, activity as tolerated and diet orders gi

## 2018-07-04 NOTE — PLAN OF CARE
Problem: CARDIOVASCULAR - ADULT  Goal: Maintains optimal cardiac output and hemodynamic stability  INTERVENTIONS:  - Monitor vital signs, rhythm, and trends  - Monitor for bleeding, hypotension and signs of decreased cardiac output  - Evaluate effectivenes Encourage visually impaired, hearing impaired and aphasic patients to use assistive/communication devices   Outcome: Progressing  Performs ADL's as able and as allowed per MD orders.     Problem: Patient/Family Goals  Goal: Patient/Family Long Term Goal  Pa

## 2018-07-04 NOTE — PROGRESS NOTES
[facility]  Neuro Critical Care Progress Note    April España Patient Status:  Inpatient    10/9/1935 MRN [id number]   [facility] 6NE-A Attending Jerry Nelson MD   Hosp Day # 1 PCP Juan Alberto Sarabia MD     Subjective:  CT head pendi mg, Oral, Nightly  •  docusate sodium (COLACE) cap 100 mg, 100 mg, Oral, BID  •  PEG 3350 (MIRALAX) powder packet 17 g, 17 g, Oral, Daily PRN  •  magnesium hydroxide (MILK OF MAGNESIA) 400 MG/5ML suspension 30 mL, 30 mL, Oral, Daily PRN  •  bisacodyl [facility] ASA/Plavix  -Dilantin level 6. No need to increase dose for now given no seizures  -Every 2 hour neuro checks.    -Hold all anticoagulants and antiplatelet therapy.   -PT OT ST when appropriate.     Cardiac:  -Systolic blood pressure goal less than 160 mm H

## 2018-07-04 NOTE — PHYSICAL THERAPY NOTE
Chart reviewed. Pt admitted on 7/3/18 thru the ED at 13:40 for acute on chronic subdural hematoma. Inpt PT orders for eval after 24 hours per protocol. No current activities in the chart for activity prior to the 24 hour protocol.   Noting that neuro cri

## 2018-07-05 NOTE — PROGRESS NOTES
[facility] hospitalist daily note    Patient was seen/examined on 7/5/18    S: today confused  Neurosurgery for right subtemoiral decompression of subdural hematoma and right frontal/parietal melissa hole    Medications in EPIC    PE 98.9 155/87  Gen: awake, confuse

## 2018-07-05 NOTE — BRIEF OP NOTE
Pre-Operative Diagnosis: Subdural hematoma (HCC) [S06.5X9A]     Post-Operative Diagnosis: Subdural hematoma ([address] [S06.5X9A]      Procedure Performed:   Procedure(s):  RIGHT SUBTEMPORAL DECOMPRESSION OF SUBDURAL HEMATOMA  AND  RIGHT FRONTAL/PARIETAL SHANAE H

## 2018-07-05 NOTE — OCCUPATIONAL THERAPY NOTE
Pt being followed by therapy. Pt to go to OR this afternoon for melissa holes. Will place patient ON HOLD.  Please re-order therapy post-operatively, as appropriate

## 2018-07-05 NOTE — ANESTHESIA PREPROCEDURE EVALUATION
PRE-OP EVALUATION    Patient Name: Germaine Haro    Pre-op Diagnosis: Subdural hematoma ([address] [T94.5W0A]    Procedure(s):  RIGHT FRONTAL TEMPORAL PARIETAL SHANAE HOLES POSSIBLE CRANIOTOMY    Surgeon(s) and Role:     * Moy Abdi MD - Primary    Pre- sodium (COLACE) cap 100 mg 100 mg Oral BID   PEG 3350 (MIRALAX) powder packet 17 g 17 g Oral Daily PRN   magnesium hydroxide (MILK OF MAGNESIA) 400 MG/5ML suspension 30 mL 30 mL Oral Daily PRN   bisacodyl (DULCOLAX) rectal suppository 10 mg 10 mg Rectal Da (+) hyperlipidemia  (+) CAD    (+) CABG/stent                            Endo/Other      (+) diabetes  type 2,                          Pulmonary        (+) COPD                   Neuro/Psych                                    Past Surgical History:  No normal     Dental             Pulmonary      Breath sounds clear to auscultation bilaterally. Other findings            ASA: 3 and emergent  Plan: general  NPO status verified and patient meets guidelines.   Patient has taken beta blockers in

## 2018-07-05 NOTE — SLP NOTE
Attempted evaluation. Patient NPO for procedure. Will hold and follow up as appropriate. Discussed with JULISSA.     Sylvia Claudio Rashard 87 CCC-SLP  Pager [phone]

## 2018-07-05 NOTE — PLAN OF CARE
Patient VSS. SBP parameter met with some meds given for control. Patient with complaints of headache, Morphine given but come confusion set in. Improvement from neuro change and decline from afternoon, CT remained unchanged.   Dr. Shruthi Burks saw patient last n

## 2018-07-05 NOTE — PROGRESS NOTES
[facility] Neurosurgery Progress Note    Gracie Fernando Patient Status:  Inpatient    10/9/1935 MRN [id number]   [facility] 6NE-A Attending Marek Arteaga MD   Hosp Day # 2 PCP Gloria Pham MD     CC:Patient presents with:  Tr 15 minutes  % of time spent educating:Greater than 50%  Burnice Clock of education / counseling: Pathology, treatment options, and expected outcomes. Lisa Garcia M.S., PA-C  [facility]  [address] [facility], [address] [address]

## 2018-07-05 NOTE — ANESTHESIA POSTPROCEDURE EVALUATION
30 BRIAN Greco Patient Status:  Inpatient   Age/Gender [de-identified]year old male MRN [id number]   [facility] 6NE-A Attending Yanique Grimm MD   Hosp Day # 2 PCP Mitzy Covarrubias MD       Anesthesia Post-op Note    Procedure(s):  RIG

## 2018-07-05 NOTE — PLAN OF CARE
0800 RECEIVED REPORT, PT'S AWAKE FOLLOWS COMMANDS,BUT CONFUSED MOST OF THE TIME. PT ADILIA BEAVERS C/O HA. GIVEN PAIN MED. ECHO BEING DONE AT BEDSIDE.  0900 DR SILVER AND DR SILVA HERRERA, PT FOR SURGERY AFTER 1300.  1000 FORICET GIVEN X1 TAB.    [address] [facility]

## 2018-07-05 NOTE — CERTIFICATION
**Certification    PHYSICIAN Certification of Need for Inpatient Hospitalization    Based on the his current state of illness, Sung Estevez requires inpatient hospitalization for his SDH

## 2018-07-05 NOTE — PHYSICAL THERAPY NOTE
Pt being followed by therapy. Pt to go to OR this afternoon for melissa holes. Will place patient ON HOLD. Please re-order therapy post-operatively, as appropriate.

## 2018-07-05 NOTE — PROGRESS NOTES
[facility]  Neuro Critical Care Progress Note    Sonya Dill Patient Status:  Inpatient    10/9/1935 MRN [id number]   [facility] 6NE-A Attending Keiry Boyd MD   Hosp Day # 2 PCP Rand Turcios MD     Subjective:  More confused rectal suppository 650 mg, 650 mg, Rectal, Q4H PRN  •  morphINE sulfate (PF) 4 MG/ML injection 1 mg, 1 mg, Intravenous, Q2H PRN **OR** morphINE sulfate (PF) 4 MG/ML injection 2 mg, 2 mg, Intravenous, Q2H PRN  •  Labetalol HCl (TRANDATE) injection 10 mg, 10 07/05/2018   BUN 12 07/05/2018    07/05/2018   K 3.8 07/05/2018    07/05/2018   CO2 25.0 07/05/2018    07/05/2018   CA 8.1 07/05/2018   PTT 30.1 07/05/2018   MG 1.8 07/05/2018   PGLU 118 07/05/2018       Imaging:  CT head images reviewed

## 2018-07-06 NOTE — PLAN OF CARE
0800 PT MORE AWAE, FOLLOWS SOME COMMANDS, DOE'S, ABLE TO ANSWER SOME SIMPLE QUESTIONS. SPEECH THERAPIST AT BEDSIDE, PT PASSED.0900 PT AND OT AT BEDSIDE, PT UP TO CHAIIR.1000 ASSISTED PT WITH BREAKFAST, HE TOLERATED MEAL WELL. [id number] [facility] BED.1400 N

## 2018-07-06 NOTE — OCCUPATIONAL THERAPY NOTE
OCCUPATIONAL THERAPY EVALUATION - INPATIENT     Room Number: 3337/4416-L  Evaluation Date: 7/6/2018  Type of Evaluation: Initial  Presenting Problem: Acute subdural hematoma, craniotomy melissa holes (R frontal and parietal)    Physician Order: IP Consult to laparoscopic  No date: OTHER      Comment: right carotid stent  No date: OTHER      Comment: leg stent  No date: OTHER      Comment: lumbar discectomy  No date: PORT, INDWELLING, IMP  10/5/09: [facility] CATARACT EXTRACAP,INSERT LENS      Comment: right w IOL  N Judgement:  decreased awareness of need for assistance and decreased awareness of need for safety  Awareness of Errors:  decreased awareness of errors   Awareness of Deficits:  decreased awareness of deficits    VISION  Current Vision: wears glasses only f oriented to place. Sit to stand with mod A. Transfer from bed to chair with total A (of 2). Attempted to assess UE ROM, pt unable to follow directions. Required increase time for processing. Chair alarm set.  Educated pt family on keeping log of pt behavior ADL/IADL HIGH  5+ performance deficits    Client Assessment/Performance Deficits HIGH - Comorbidities and significant modifications of tasks    Clinical Decision Making HIGH - Analysis of occupational profile, comprehensive assessments, multiple treatment

## 2018-07-06 NOTE — PROGRESS NOTES
[facility]  Neuro critical care progress Note    Florencia Rae Patient Status:  Inpatient    10/9/1935 MRN [id number]   [facility] 6NE-A Attending Reyes Aldana MD   [facility] Day # 3 PCP Chioc Ramachandran MD     Subjective:  Luz Elena Fong 07/06/2018    07/06/2018   CA 8.1 07/06/2018   ALB 3.0 07/06/2018   ALKPHO 292 07/06/2018   BILT 0.5 07/06/2018   TP 6.8 07/06/2018   AST 41 07/06/2018   ALT 28 07/06/2018   PTT 30.6 07/06/2018       CT HEAD- 7/6/18  CONCLUSION:  1.   Interval postsu

## 2018-07-06 NOTE — SLP NOTE
ADULT SWALLOWING EVALUATION    ASSESSMENT    ASSESSMENT/OVERALL IMPRESSION:  Patient seen for swallowing assessment per protocol. Patient admitted post fall with head trauma.   He was found to have acute subdural hemorrhage overlying the right cerebral hem Medical History  Past Medical History:   Diagnosis Date   • Atherosclerosis of coronary artery    • CANCER     NHL mantle cell type   • Cataract     surgery several years ago   • Closed head injury dec1990   • COPD (chronic obstructive pulmonary disease) ( hematoma present, greatest along the right tentorium.   2. Cerebral atrophy with small vessel changes.           Dictated by: Tanmay Martin MD on 7/06/2018 at 6:25       Approved by: Tanmay Martin MD          SUBJECTIVE       OBJECTIVE   ORAL MOTOR EX Established Goals: 5  Follow Up Needed: Yes  SLP Follow-up Date: 07/09/18    Thank you for your referral.   If you have any questions, please contact Misty Ray 92  Pager [phone]

## 2018-07-06 NOTE — PROGRESS NOTES
[facility] Neurosurgery Progress Note    Madina Schwartz Patient Status:  Inpatient    10/9/1935 MRN [id number]   [facility] 6NE-A Attending Katherin Dao MD   Hosp Day # 3 PCP Yrn Guo MD     CC:Patient presents with:  Tr post surgical changes with right frontal melissa holes and interval decrease in right sided subdural hematoma with residual acute subdural hematoma present greatest along the right tentorium. Assessment:  1. Right sided subdural hematoma.   2.  S/p right s

## 2018-07-06 NOTE — PROGRESS NOTES
[facility] hospitalist daily note     Patient was seen/examined on 7/6/18     S: s/p neurosurgery intervention on 7/5/18  Still confused.   Denies pain, denies SOB, denies nausea     Medications in EPIC     PE  143/64  Gen: awake,not oriented,  no respiratory dist

## 2018-07-06 NOTE — OPERATIVE REPORT
[facility]    OPERATIVE REPORT    Patient:  Amador Wiggins;  YOB: 1935     CSN:  [id number]; Medical Record Number:  [id number]    Admission Date:  7/3/2018 Operation Date:  7/5/2018    . ..........................     Operating Physician left expose the right frontotemporoparietal area after placing a large gel pad underneath his right torso to turn both his body and had to the left without over rotating his neck.   Hair was clipped for a potential reverse question ha shaped incision and delivered some clot but I suspect left some behind. The temporal lobe was nicely relaxed and pulsatile. I left this area covered with a large cottonoid and proceeded to place a frontal bur hole higher up over the convexity.     With a separate incision at Sterile dressings were applied to both incisions. He was taken out of three-point pin fixation.   He was extubated in the operating room and was taken to the neurosurgical intensive care unit where he began to move all 4 extremities with reasonable streng

## 2018-07-06 NOTE — PHYSICAL THERAPY NOTE
PHYSICAL THERAPY EVALUATION - INPATIENT     Room Number: 0088/5860-F    Evaluation Date: 7/6/2018  Type of Evaluation: Qg-hzomyowczk-goejkzgru surgery 7/5/18  Physician Order: PT Eval and Treat    Presenting Problem: SDH, s/p R subtemp decomp of SDH &r mention of complication, not stated as uncontrolled    • Unspecified essential hypertension        Past Surgical History  Past Surgical History:  No date: CABG  2000: CABG, ARTERIAL, SINGLE  2000: CAROTID ENDARTERECTOMY      Comment: right  No date: ZOLATN delayed responses to stimuli and generalized responses  · Orientation Level:  oriented to person  · Following Commands:  follows one-step commands inconsistently, follows one step commands with increased time and follows one step commands with repetition room?: Total   -   Climbing 3-5 steps with a railing?: Total     AM-PAC Score:  Raw Score: 10   PT Approx Degree of Impairment Score: 76.75%   Standardized Score (AM-PAC Scale): 32.29   CMS Modifier (G-Code): CL    FUNCTIONAL ABILITY STATUS  Gait Assessmen 7/3/2018 for acute on chronic SDH, s/o right subtemporal decomp of SDH and right frontal/parietal melissa hole 7/5/18. Pertinent comorbidities and personal factors impacting therapy include h/o COPD, CAD, seizures, cancer.    In this PT evaluation, the patie moderate assistance     Goal #3 Patient is able to ambulate feet with assist device: walker - rolling at assistance level: To be assessed when appropriate     Goal #4 Compliance with activity recommendations.      Goal #5    Goal #6    Goal Comments: Goal

## 2018-07-06 NOTE — PLAN OF CARE
Patient VSS, SBP managed with Nicardapine gtt. Neuro Q1hr with some confusion, Oriented x2-3. (person, year). Some restlessness throughout the night but manageable. Patient Aguayo intact, A-line in use. Updated with plan of care, will monitor closely.

## 2018-07-07 NOTE — CM/SW NOTE
07/07/18 0800   CM/SW Screening   Referral [address] staff; Chart review   Patient's Mental Status Alert   Patient's [address]   Number of Levels in Home 2   Patient lives with Spouse; Children   Patient Status

## 2018-07-07 NOTE — CONSULTS
[facility]  Report of Consultation    Marcy De Los Santos Patient Status:  Inpatient    10/9/1935 MRN [id number]   [facility] 7NE-A Attending Aram Wilkins MD   Hosp Day # 4 PCP Liane Benson MD     Impression and Plan: Aguayo trauma. (APRESOLINE) injection 10 mg 10 mg Intravenous Q1H PRN   ondansetron HCl (ZOFRAN) injection 4 mg 4 mg Intravenous Q6H PRN   docusate sodium (COLACE) cap 100 mg 100 mg Oral BID   famoTIDine (PEPCID) tab 20 mg 20 mg Oral BID   Or      famoTIDine (PEPCID) inj cell type   • Cataract     surgery several years ago   • Closed head injury dec1990   • COPD (chronic obstructive pulmonary disease) (HCC)    • Coronary atherosclerosis of unspecified type of vessel, native or graft    • Esophageal reflux    • Hearing impa Other Topics Concern   None on file     Social History Narrative    : 1956    Children: 3    Exercise: bowl, bikes    Employment: retired    Caffeine intake: pot coffee/d, 4 diet soda/d               Review of Systems:  No SOB, angina, fevers, f

## 2018-07-07 NOTE — PHYSICAL THERAPY NOTE
PHYSICAL THERAPY TREATMENT NOTE - INPATIENT    Room Number: 2883/7035-O     Session: 1/ 7 s/p re-evauation on 7/6/18     Number of Visits to Meet Established Goals: 7    Presenting Problem: SDH, s/p R subtemp decomp of SDH &right frontal/parietal melissa hol CAROTID ENDARTERECTOMY      Comment: right  No date: CATARACT      Comment: od 10yrs ago  No date: LAPAROSCOPY, SURGICAL PROSTATECTOMY, RETROPUBI*      Comment: laparoscopic  No date: OTHER      Comment: right carotid stent  No date: OTHER      Comment: le Modifier (G-Code): CK    FUNCTIONAL ABILITY STATUS  Gait Assessment   Gait Assistance: Dependent assistance (actual min to mod A)  Distance (ft): 70  Assistive Device: Rolling walker  Pattern: Shuffle (kyphotic)  Stoop/Curb Assistance: Not tested  Comment Hospital d/c.       DISCHARGE RECOMMENDATIONS  PT Discharge Recommendations: Acute rehabilitation     PLAN  PT Treatment Plan: Bed mobility; Endurance; Energy conservation;Patient education; Family education;Gait training;Strengthening;Balance training;Transf

## 2018-07-07 NOTE — PROGRESS NOTES
[facility]  LEFTY Progress Note    Freddy Sanders Patient Status:  Inpatient    10/9/1935 MRN [id number]   [facility] 6NE-A Attending Margo Menjivar MD   [facility] Day # 4 PCP Karen Chappell MD       Subjective: No complaints this morning,

## 2018-07-07 NOTE — PROGRESS NOTES
[facility]  Neurocritical care progress Note    Chema Limb Patient Status:  Inpatient    10/9/1935 MRN [id number]   [facility] 7NE-A Attending Sheryle Bjork, MD   [facility] Day # 4 PCP Candelario Dallas MD         Subjective:  Hoa Thomson changes with right frontal bur holes and interval decrease and right-sided subdural hematoma with residual acute subdural hematoma present, greatest along the right tentorium.   2. Cerebral atrophy with small vessel changes.           Assessment/plan  Princ

## 2018-07-07 NOTE — PROGRESS NOTES
[facility] hospitalist daily note     Patient was seen/examined on 7/7/18     S: s/p neurosurgery intervention on 7/5/18  Confusion improved  Denies pain, denies SOB, denies nausea     Medications in EPIC     PE     07/07/18  1125   BP: 132/60   Pulse:    Resp: 2

## 2018-07-07 NOTE — PLAN OF CARE
Patient is alert and oriented time 3-4, confusion  Neurological assessment otherwise intact  Telfa dressing to rt head, intact with drainage, more staples ABEBA  Previous RN, and pts family report patient has made vast improvements cognitively since yesterda

## 2018-07-07 NOTE — PLAN OF CARE
0800 PT SITTING UP IN BED, ASSISTED WITH BREAKFAST, PT STATES NO C/O PAIN. RIGHT TEMPORAL INCISION ABEBA WITH STAPLES INTACT, ONE INCISIONON TOP OF RIGHT HEAD TELFA INTACT.  PT LESS CONFUSED THIS AM. FOLLOWS MORE COMMANDS AND ANSWERS SIMPLE QUESTIONS APPROXIM

## 2018-07-07 NOTE — OCCUPATIONAL THERAPY NOTE
OCCUPATIONAL THERAPY TREATMENT NOTE - INPATIENT     Room Number: 0421/0087-Q  Session: 1   Number of Visits to Meet Established Goals: 5    Presenting Problem: Acute subdural hematoma, craniotomy melissa holes (R frontal and parietal)    History related to cu OTHER      Comment: lumbar discectomy  No date: PORT, INDWELLING, IMP  10/5/09: REMV CATARACT EXTRACAP,INSERT LENS      Comment: right w IOL  No date: THYROIDECTOMY      Comment: partial    SUBJECTIVE  I'm going to the 7th floor today    Patient self-state Progressing. Goals to be updated today. Pt still w deficits in decreased strength and endurance, increased pain,decreased balance, and decreased knowledge of compensatory techniques for ADLs.   Recommend AR upon d/c.    OT Discharge Recommendations: Acute

## 2018-07-08 NOTE — PLAN OF CARE
Assumed care of patient at 1900. Alert and oriented x4, hard of hearing. Patient with misfit dentures so at times difficult to understand. Mildly confused but per report has much improved. VSS Room air with clear lung sounds.  Aguayo catheter for urinary rete

## 2018-07-08 NOTE — PLAN OF CARE
Assumed care at 0700. Patient is alert and oriented. Denies any complaint of pain. Vitals stable, NSR per tele. Up with standby assist. Right head incision dry and intact, telfa dressing removed. Aguayo draining clear yellow, leg bag on per urology.  Plan to

## 2018-07-08 NOTE — PROGRESS NOTES
[facility]  LEFTY Progress Note    Janette Stein Patient Status:  Inpatient    10/9/1935 MRN [id number]   [facility] 6NE-A Attending Pineda Banks MD   [address] Day # 5 PCP Renaldo Taylor MD       Subjective: Minimal pain, no confusion.

## 2018-07-08 NOTE — PROGRESS NOTES
[facility]  Urology Progress Note    Luke Check Patient Status:  Inpatient    10/9/1935 MRN [id number]   [facility] 7NE-A Attending Cassandra García MD   Hosp Day # 5 PCP Ivania Gaspar MD     Assessment: History of prostate cancer

## 2018-07-08 NOTE — CONSULTS
.30 BRIAN Greco Patient Status:  Inpatient    10/9/1935 MRN [id number]   [facility] 7NE-A Attending Jasmin Mchugh MD   [facility] Day # 5 PCP Riki Pena MD     Patient Identification  Faisal Velez is a [de-identified]year old mal Diagnosis Date   • Atherosclerosis of coronary artery    • CANCER     NHL mantle cell type   • Cataract     surgery several years ago   • Closed head injury dec1990   • COPD (chronic obstructive pulmonary disease) (HCC)    • Coronary atherosclerosis of u sulfate (PF) 4 MG/ML injection 1 mg 1 mg Intravenous Q2H PRN   Or      morphINE sulfate (PF) 4 MG/ML injection 2 mg 2 mg Intravenous Q2H PRN   Or      morphINE sulfate (PF) 4 MG/ML injection 4 mg 4 mg Intravenous Q2H PRN   niCARdipine HCl in NaCl (CARDENE) suppository 10 mg 10 mg Rectal Daily PRN   FLEET ENEMA (FLEET) 7-19 GM/118ML enema 133 mL 1 enema Rectal Once PRN   Metoclopramide HCl (REGLAN) injection 10 mg 10 mg Intravenous Q8H PRN   atenolol (TENORMIN) tab 75 mg 75 mg Oral Daily   atorvastatin (LIPIT Physical Exam:                                      General: Alert, cooperative, no distress, appears stated age. Head:  Normocephalic, without obvious abnormality, atraumatic. Eyes:  Conjunctivae/lids clear. PERRL, EOMs intact.  Vision functional. status, pt would benefit from Acute Rehabilitation, working with PT/OT/SLP to upgrade present functional status, provide family training, assess home equipment and assistive device needs.             24 hr rehab nursing also beneficial for medication manage

## 2018-07-08 NOTE — PROGRESS NOTES
[facility] hospitalist daily note     Patient was seen/examined on 7/8/18     S:per patient he is ok  Confusion improved  Denies pain, denies SOB, denies nausea     Medications in EPIC     PE     07/08/18  1225   BP: 107/49   Pulse: 63   Resp: 18   Temp: 97.8 °F interstitial markings b/l, no focal consolidation  Patient received cefazolin during admit    #urinary retention; patient pulled out baez during admit, replaced. Seen by .  Void trial next week     Preventative SCDs    ANOTHER hospitalist will see this p

## 2018-07-08 NOTE — PHYSICAL THERAPY NOTE
PHYSICAL THERAPY TREATMENT NOTE - INPATIENT    Room Number: 3408/4528-A     Session: 2   Number of Visits to Meet Established Goals: 7    Presenting Problem: SDH, s/p R subtemp decomp of SDH &right frontal/parietal melissa hole 7/5/18    Problem List  Princi promotion;Relaxation;Repositioning    BALANCE                                                                                                                     Static Sitting: Fair -  Dynamic Sitting: Fair -           Static Standing: Poor +  Dynamic Sta verbalized understanding and agreement.     THERAPEUTIC EXERCISES  Lower Extremity Alternating marching  Ankle pumps  Hip AB/AD  LAQ     Upper Extremity      Position Sitting     Repetitions   10   Sets   1     Patient End of Session: Up in chair;Needs met;

## 2018-07-09 NOTE — PLAN OF CARE
Assumed care at 0700. Patient is alert and oriented. Denies any complaint of pain. Vitals stable, NSR per tele. Up with standby assist. Right head incision dry and intact. Aguayo draining clear yellow. Ok to discharge per all consults.  Report called to [employer]

## 2018-07-09 NOTE — SLP NOTE
SPEECH DAILY NOTE - INPATIENT    ASSESSMENT & PLAN   ASSESSMENT  Pt seen for dysphagia tx to assess tolerance with recommended diet, ensure proper utilization of aspiration precautions and provide pt/family education.   Patient alert and up in bedside chair Communication: Discussed with RN            GOALS  Goal #1 The patient will tolerate puree consistency and thin liquids without overt signs or symptoms of aspiration with 100 % accuracy over 2-3 session(s).   Met   Goal #2 The patient/family/caregiver will

## 2018-07-09 NOTE — PROGRESS NOTES
[address] [facility] Neurology Progress Note    April España Patient Status:  Inpatient    10/9/1935 MRN [id number]   [facility] 7NE-A Attending Jerry Nelson MD   [facility] Day # 6 PCP Jua nAlberto Sarabia MD         Subjective:  April España decrease and right-sided subdural hematoma with residual acute subdural hematoma present, greatest along the right tentorium. 2. Cerebral atrophy with small vessel changes.     • docusate sodium  100 mg Oral BID   • famoTIDine  20 mg Oral BID    Or   • fam subdural hematoma (HCC)    Assessment/Plan:    Mantel cell Lymphoma   Hypertension   Seizure disorder on dilantin    This is a [de-identified]year old male who is s/p melissa holes for evacuation of right SDH    Diagnostics/Imaging    HCT 7/6 decrease in right SDH with r

## 2018-07-09 NOTE — CM/SW NOTE
Pt is ready for d/c today. Pt will be going to [place] [address]. Bar Bonilla at [place] [address] to get a bed for pt. She called back to say that pt will be going to Room 2283. Arranged [facility] which will be a [facility] to  pt at 5:30pm today.

## 2018-07-09 NOTE — OCCUPATIONAL THERAPY NOTE
OCCUPATIONAL THERAPY TREATMENT NOTE - INPATIENT     Room Number: 7773/7193-Z  Session: 2   Number of Visits to Meet Established Goals: 5    Presenting Problem: Acute subdural hematoma, craniotomy melissa holes (R frontal and parietal)    History related to cu OTHER      Comment: lumbar discectomy  No date: PORT, INDWELLING, IMP  10/5/09: REMV CATARACT EXTRACAP,INSERT LENS      Comment: right w IOL  No date: THYROIDECTOMY      Comment: partial    SUBJECTIVE  \"You really think I am doing better? \"    Patient cathy subdural  hematoma. .  Pt continues to progress with both self-care and func'l mobility; however is limited d/t attention, following instructions, posture, balance, problem solving, flexible thinking, and use of adaptive techniques.   These interfere with ab assist  Patient will transfer from supine to sit:  with mod assist  Patient will transfer from sit to stand:  with mod assist  Patient will transfer form stand to sit:  with mod assist     UE Exercise Program Goal  Patient will be moderate assistance with

## 2018-07-09 NOTE — PROGRESS NOTES
Neurological Surgery Attending    Brooklynn Ray    Interval History: Steadily improving and cognition and strength. Some modest headache today. Awaiting placement for rehab. Interval Examination:  Awake alert and oriented. Sitting up in chair.   5 o

## 2018-07-09 NOTE — PROGRESS NOTES
UROLOGY PROGRESS NOTE    Patient seeing PT at present. Baez draining clear yellow. No complaints with baez per RN staff. Urine culture negative. Plan to d'c to [facility] today.   Okay to dc with baez catheter today from  perspective - voiding trial on W

## 2018-07-09 NOTE — PLAN OF CARE
Assumed care at [address] [place] [address]. Pt A/O x4. RA. NSR on tele. VSS. Neuro checks q4. Pt describes his R eye as being \"blurred on the top half since his surgery\". Otherwise he is neurologically intact. Aguayo in place draining clear, yellow urine.    Waiting on insura

## 2018-07-09 NOTE — PHYSICAL THERAPY NOTE
PHYSICAL THERAPY TREATMENT NOTE - INPATIENT    Room Number: 8023/2327-O     Session: 3  Number of Visits to Meet Established Goals: 7    Presenting Problem: SDH, s/p R subtemp decomp of SDH &right frontal/parietal melissa hole 7/5/18    Problem List  Princip Activity promotion;Relaxation;Repositioning    BALANCE                                                                                                                      Static Sitting: Fair -  Dynamic Sitting: Fair -           Static Standing: Poor +  D of session. Mobility Guidelines updated in Pt room for nursing staff.        THERAPEUTIC EXERCISES  Lower Extremity Alternating marching  Ankle pumps  Hip AB/AD  LAQ     Upper Extremity      Position Sitting     Repetitions   10   Sets   1     Patient En ongoing as of 7/9/2018

## 2018-07-10 NOTE — DISCHARGE SUMMARY
[facility] Internal Medicine Discharge Summary    Patient ID:  Faisal Velez  [id number]  [de-identified]year old  10/9/1935    Admit date: 7/3/2018  Discharge date and time: 7/9/18  Attending Physician: Mariam Mancuso MD  Primary Care Physician: Riki Pena MD     Adm plavix were held. He was accepted at [facility] for rehab. He had a CT scan prior to d/c that was stable. He was noted to have urinary retention and seen by urology with baez placed and plan for voiding trial next week.     It will need to be addressed as to when Extended 100 MG Caps  Commonly known as:  DILANTIN     Vitamin B-12 1000 MCG Tabs  Commonly known as:  VITAMIN B12     Vitamin D-3 5000 units Tabs        * This list has 2 medication(s) that are the same as other medications prescribed for you.  Read the [facility] stable. Minimal hemorrhage layering in the left side at tentorium is unchanged. Hemorrhage along the lateral caudal aspect of the right temporal lobe measuring up to 7 mm in thickness is stable.   Adjacent to the right frontal lobe with the the subacute h matter which are nonspecific but most consistent with small vessel ischemic changes. The visualized paranasal sinuses show no significant sinus disease. No evidence of depressed skull fracture. CONCLUSION: 1.   Interval postsurgical changes with right midline shift, no developing herniation or hydrocephalus. Stable trace blood within the left lateral ventricle.     Dictated by: Shree Slade MD on 7/04/2018 at 18:56     Approved by: Shree Slade MD            Ct Brain Or Head ([id number])    Result Date COMPARISON:  [facility] , CT BRAIN OR HEAD ([id number]), 2/16/2015, 4:11. INDICATIONS:  head pain or injury  TECHNIQUE:  Noncontrast CT scanning is performed through the brain. Dose reduction techniques were used.  Dose information is transmitted to the ACR ([facility] , XR CHEST PA + LAT CHEST (GFQ=[id number]), 3/27/2015, 15:02. [facility] , XR CHEST AP PORTABLE  (CPT=[id number]), 7/03/2018, 14:09.   INDICATIONS:  altered mental status, leukocytosis, elevated temp   FINDINGS:  There is a right subclavian chest port catheter no region which is new since the previous exam and suspicious for possible underlying hilar adenopathy or mass. Further assessment with CT scanning is recommended. No acute osseous abnormalities. CONCLUSION:  1. No acute cardiopulmonary process noted.

## 2018-07-10 NOTE — CM/SW NOTE
07/10/18 0900   Discharge disposition   Expected discharge disposition Rehab [facility]   Name of Facillity/Home Care/Hospice [facility]   Patient is Discharged to a [address] [place] [address] Yes   Discharge transportation [facility]

## 2018-08-14 NOTE — TELEPHONE ENCOUNTER
Pt is @ Outpatient Registration in [facility] now stating the CT that was ordered needs to be done today, registration calling to clarify.  Please call outpatient back once provider clarifies

## 2018-08-14 NOTE — PATIENT INSTRUCTIONS
Refill policies:    • Allow 2-3 business days for refills; controlled substances may take longer.   • Contact your pharmacy at least 5 days prior to running out of medication and have them send an electronic request or submit request through the “request re entire amount billed. Precertification and Prior Authorizations: If your physician has recommended that you have a procedure or additional testing performed.   [facility]) will contact your insurance carrier to obtain pre-certi

## 2018-08-14 NOTE — PROGRESS NOTES
Patient here for post-op appointment. Surgery was on 7/5/18 for RIGHT FRONTAL TEMPORAL PARIETAL SHANAE HOLES POSSIBLE CRANIOTOMY for SDH.

## 2018-08-14 NOTE — PROGRESS NOTES
Neurosurgery Clinic Visit  Post-op Crani  2018    Estrellita Shows PCP:  Florina Schwartz MD    10/9/1935 MRN [id number]           REASON FOR VISIT: Post op visit      HISTORY OF PRESENT Erla Hashimoto is a [de-identified]year old male s/p right subtemp Oral Cap Take 300 mg by mouth nightly. Disp:  Rfl:    Calcium Carbonate-Vitamin D 600-400 MG-UNIT Oral Tab Take 1 tablet by mouth daily. Disp:  Rfl:    Vitamin B-12 1000 MCG Oral Tab Take 1,000 mcg by mouth daily.  Disp:  Rfl:    Acidophilus/Pectin Oral Cap imaging      ASSESSMENT and PLAN:  (S06.5X9A) Subdural hematoma (HCC)  (primary encounter diagnosis)    (B84.001) History of melissa hole surgery  Comment: S/p right subtemporal decompression of subdural hematoma and right frontal/parietal melissa hold, separate

## 2018-08-16 NOTE — TELEPHONE ENCOUNTER
Can we please call the patient or patient's son to remind them to have CT brain done as soon as they can? He is s/p hospital admission and burrhole for SDH. That was about over a month ago.  At visit, son advised please go to outpatient radiology near

## 2018-08-16 NOTE — TELEPHONE ENCOUNTER
Spoke with Willian Frazierma. Ok to change to STAT. Order changed. Nothing further needed at this time.

## 2018-08-16 NOTE — TELEPHONE ENCOUNTER
Spoke with patient's son and informed him of message below. Son stated they did try to complete CT after appointment on 8/14/18, but the waited for an hour and they were not able to get it done. The order was not placed as STAT.      Informed patient's son

## 2018-08-17 PROBLEM — C83.10 MANTLE CELL LYMPHOMA (HCC): Status: ACTIVE | Noted: 2018-01-01

## 2018-08-17 PROBLEM — D72.820 LYMPHOCYTOSIS: Status: ACTIVE | Noted: 2018-01-01

## 2018-08-17 PROBLEM — D69.6 THROMBOCYTOPENIA (HCC): Status: ACTIVE | Noted: 2018-01-01

## 2018-08-17 PROBLEM — D64.9 ANEMIA, NORMOCYTIC NORMOCHROMIC: Status: ACTIVE | Noted: 2018-01-01

## 2018-08-17 NOTE — TELEPHONE ENCOUNTER
Called and informed patient's son that new CT showed slight increase in blood collection in brain. Patient was doing well at last visit so not very concerning. Patient should get repeat CT in 2 weeks prior to appointment on 8/30/18.  Son verbalized Dodie Arcos

## 2018-08-18 NOTE — TELEPHONE ENCOUNTER
Radiology called with STAT CT brain results. Please see report. Last visit, patient was non drowsy and neurologically intact. No complaints. No headaches, weakness, numbness, tingling. See note for further details. Reviewed imaging.  Plan to monitor patient

## 2018-08-30 NOTE — PATIENT INSTRUCTIONS
Refill policies:    • Allow 2-3 business days for refills; controlled substances may take longer.   • Contact your pharmacy at least 5 days prior to running out of medication and have them send an electronic request or submit request through the “request re entire amount billed. Precertification and Prior Authorizations: If your physician has recommended that you have a procedure or additional testing performed.   [facility]) will contact your insurance carrier to obtain pre-certi

## 2018-08-30 NOTE — PROGRESS NOTES
Here with new CT to revie.wFollow up, continues to have headaches, light sensitivity, blurred. No N/V. Mental status unchanged since last visit. No changes since last visit. Per son, he found out that patient has been taking Plavix for unknown timeframe.

## 2018-08-30 NOTE — PROGRESS NOTES
Neurosurgery Clinic Visit  Post-op Crani  2018    April España PCP:  Juan Alberto Sarabia MD    10/9/1935 MRN [id number]           REASON FOR VISIT: Post op visit      HISTORY OF PRESENT Silas Perez is a [de-identified]year old male s/p right subtemp is dramatically improved. No other vision changes. No weakness, numbness, tingling. States still having intermittent headaches. He has had these since prior to surgery, but they are improving.   Facial droop or change in speech.      No fevers or chills daily.   Disp:  Rfl:      No current facility-administered medications on file prior to visit. REVIEW OF SYSTEMS:  Comprehensive review of systems done. Negative except what is outlined in the above HPI.       PHYSICAL EXAMINATION:  /58   Pulse frontal lobe and parietal lobes laterally and posterior medial to the right occipital lobe. The maximum thickness ranges from 9 mm up to 11 mm   unchanged. The midline shift from right to left previously was 3 mm, presently 2 mm.   Stable mass effect upon months  3.  Follow up in 6 months with Dr. Jorge Marlow or call or follow up sooner or go to the ED for any new, worsening or concerning signs or symptoms   - If any change in neurologic status, including but not limited to, drowsiness, worsening headaches, weakne

## 2018-09-06 PROBLEM — C85.90 NHL (NON-HODGKIN'S LYMPHOMA) (HCC): Status: ACTIVE | Noted: 2018-01-01

## 2018-09-06 NOTE — PLAN OF CARE
NURSING ADMISSION NOTE      Patient admitted via Wheelchair  Oriented to room. Safety precautions initiated. Bed in low position. Call light in reach. Admission navigator completed. Endorsed to [place].

## 2018-09-06 NOTE — H&P
[facility] Hospitalist History and Physical      CC:  Anemia, tumor lysis      PCP: Mitzy Covarrubias MD      History of Present Illness: Patient is a [de-identified]year old male with PMH sig for mantle cell lymphoma, CAD, DM2, COPD, seizures presents as a direct admit for an outpatient prescriptions on file.        Smoking status: Former Smoker  0.25 Packs/day     Types: Cigarettes    Start date: 1/19/1953    Quit date: 9/19/2014    Smokeless tobacco: Never Used    Comment: using electronic cigarettes    Alcohol use No    Comme COMPARISON:  [facility] , CT BRAIN OR HEAD ([id number]), 7/09/2018, 15:08. [facility] , CT BRAIN OR HEAD ([id number]), 7/06/2018, 5:20. [facility] , CT BRAIN OR HEAD ([id number]), 7/04/2018, 18:31. [facility] , CT BRAIN OR HEAD ([id number]), 7/04/2018, 10:18.   [facility] , CT BRAIN OR HEAD (7 right-sided subdural hematoma. The midline shift previously was 3 mm, presently 2 mm. The basal cisterns are patent. Stable chronic deep white matter ischemic changes.     Dictated by: Arlene Aquino MD on 8/28/2018 at 10:58     Approved by: Janice Sutton right temporal lobe as well as pneumocephalus and hemorrhage along the tentorium particularly on the right. This report was communicated by telephone at the dictation time shown below.        Dictated by: Maeve Simpson MD on 8/17/2018 at 12:44     Approved by

## 2018-09-07 NOTE — PROGRESS NOTES
[facility] Hospitalist Progress Note     [facility]      SUBJECTIVE:  No CP or palpitations  + shortness of breath, started yesterday  + cough    OBJECTIVE:  Temp:  [98 °F (36.7 °C)-98.7 °F (37.1 °C)] 98.2 °F (36.8 °C)  Pulse:  [57-89] 57  Resp:  [16-20] 1 BRAIN OR HEAD ([id number]), 7/04/2018, 18:31. [facility] , CT BRAIN OR HEAD ([id number]), 7/04/2018, 10:18. [facility] , CT BRAIN OR HEAD ([id number]), 7/03/2018, 14:23. [facility] , CT BRAIN OR HEAD ([id number]), 8/17/2018, 12:18.   INDICATIONS:  Y39.7H5R Traumatic subdural hemorrhage chronic deep white matter ischemic changes.     Dictated by: Yoni Reaves MD on 8/28/2018 at 10:58     Approved by: Yoni Reaves MD            Ct Brain Or Head ([id number])    Result Date: 8/17/2018  PROCEDURE:  CT BRAIN OR HEAD ([id number])  COMPARISON: communicated by telephone at the dictation time shown below. Dictated by: Yadira Shukla MD on 8/17/2018 at 12:44     Approved by: Yadira Shukla MD               Meds:     No current outpatient prescriptions on file.       Current Facility-Administered Medic monitor  - Uric acid ok today    # Mantel Cell NHL  - Follows with oncology  - Has been on acalabrutinib x 1 week  - Holding in setting of TLS    # Leukocytosis  - To be expected with acalabrutinib per oncology    # COPD  - patient unsure what inhalers he

## 2018-09-07 NOTE — PLAN OF CARE
HEMATOLOGIC - ADULT    • Maintains hematologic stability Progressing        SAFETY ADULT - FALL    • Free from fall injury Progressing          GASTROINTESTINAL - ADULT    • Maintains or returns to baseline bowel function Not Progressing    • Maintains jesse

## 2018-09-07 NOTE — CONSULTS
[facility]  Report of Consultation    Verena Khalil Patient Status:  Inpatient    10/9/1935 MRN [id number]   [facility] 4NW-A Attending Poly Benedict MD   Hosp Day # 1 PCP Alisa Brenner MD     REASON FOR CONSULTATION:     Mantle EXTRACAP,INSERT LENS      Comment: right w IOL  No date: THYROIDECTOMY      Comment: partial  Family History   Problem Relation Age of Onset   • Psychiatric Mother      nervous breakdowns   • Psychiatric Daughter      mental health, substance abuse   • Oth Daily    Review of Systems:  A 10-point review of systems was done with pertinent positives and negatives per the HPI. Physical Exam:   Blood pressure 114/52, pulse 63, temperature 98.2 °F (36.8 °C), temperature source Oral, resp.  rate 17, weight 62.2 k 09/06/18  3:09 PM   Result Value Ref Range   Antibody Screen Negative    -PREPARE RBC   Collection Time: 09/06/18  4:43 PM   Result Value Ref Range   Blood Product [id number]    Unit Number [id number]    UNIT ABO A    UNIT RH POS    Product Status Issue Result Value Ref Range   WBC 80.8 (H) 4.0 - 13.0 x10(3) uL   RBC 2.01 (L) 3.80 - 5.80 x10(6)uL   HGB 6.3 (LL) 13.0 - 17.0 g/dL   HCT 20.1 (L) 37.0 - 53.0 %   PLT 27.0 (L) 150.0 - 450.0 10(3)uL   .0 (H) 80.0 - 99.0 fL   MCH 31.3 27.0 - 33.2 pg   [facility] 80.8*   --    PLT  40*   --   27.0*   --        Recent Labs   Lab  09/06/18   0934  09/06/18   1415  09/07/18   0553   GLU  144*  121*  102*   BUN  31*  34*  33*   CREATSERUM  1.71*  1.66*  1.33*   GFRAA   --   44*  58*   GFRNAA   --   38*  50*   CA   -- Anemia, normocytic normochromic     Thrombocytopenia (HCC)     Lymphocytosis     NHL (non-Hodgkin's lymphoma) (HCC)        ASSESSMENT AND PLAN:     Chema Andres is a [de-identified]year old male with     1.  Mantle cell NHL  On acalabrutinib - started last week for

## 2018-09-07 NOTE — DIETARY NOTE
NUTRITION INITIAL ASSESSMENT    Pt is at moderate nutrition risk. Pt does not meet malnutrition criteria.     NUTRITION DIAGNOSIS/PROBLEM:    Unintentional weight loss related to physiological causes as evidenced by reported 10# wt loss in 1 week    NUTRITI discretion    MONITOR AND EVALUATE/NUTRITION GOALS:    1. PO intake to meet at least 75% patient nutrition prescription  2. At least 75% intake of oral supplements  3. No signs of skin breakdown  4.  Maintain lean body mass    MEDICATIONS:  Noted    LABS:

## 2018-09-07 NOTE — PHYSICAL THERAPY NOTE
PHYSICAL THERAPY EVALUATION - INPATIENT     Room Number: 407/407-A  Evaluation Date: 9/7/2018  Type of Evaluation: Initial  Physician Order: PT Eval and Treat    Presenting Problem: anemia and management of tumor lysis  Reason for Therapy: Mobility D memory loss (noted dx from 2009 in chart). History of seizures.      Therapy significant labs:  9/7/2018 - Hemoglobin - 7.4 g/dL      Problem List  Active Problems:    NHL (non-Hodgkin's lymphoma) [facility])      Past Medical History  Past Medical History:   Rose (rollator)    Prior Level of [place]: Pt states that he is using rollator for some time. He has been through IP and OP PT at [facility]. SUBJECTIVE  \"I know the drill, I have been through PT at [facility]".  Pt states that he did not fall, he does n None   -   Sitting down on and standing up from a chair with arms (e.g., wheelchair, bedside commode, etc.): None   -   Moving from lying on back to sitting on the side of the bed?: None   How much help from another person does the patient currently need. Daniele Paul following impairments weakness due to prolonged bed rest.  Functional outcome measures completed include The AM-PAC '6-Clicks' Inpatient Basic Mobility Short Form was completed and this patient is demonstrating a 20.91% degree of impairment in mobility.  Re

## 2018-09-08 NOTE — PLAN OF CARE
GASTROINTESTINAL - ADULT    • Maintains or returns to baseline bowel function Not Progressing          GASTROINTESTINAL - ADULT    • Maintains adequate nutritional intake (undernourished) Progressing        HEMATOLOGIC - ADULT    • Maintains hematologic st

## 2018-09-08 NOTE — PROGRESS NOTES
[facility] Hospitalist Progress Note     [facility]      SUBJECTIVE:  States breathing is the same, also with cough.       OBJECTIVE:  Temp:  [97.5 °F (36.4 °C)-97.9 °F (36.6 °C)] 97.9 °F (36.6 °C)  Pulse:  [68-75] 69  Resp:  [16-18] 16  BP: ()/(44-45 ([id number]), 7/03/2018, 14:23. YOB[facility] , CT BRAIN OR HEAD ([id number]), 8/17/2018, 12:18.   INDICATIONS:  S06.5X9A Traumatic subdural hemorrhage with loss of consciousness of unspecified duration, initial encounter  TECHNIQUE:  Noncontrast CT scanning is performed t Harper Aldana MD            Ct Brain Or Head ([id number])    Result Date: 8/17/2018  PROCEDURE:  CT BRAIN OR HEAD ([id number])  COMPARISON:  YOB[facility] CT BRAIN OR HEAD ([id number]), 7/09/2018, 15:08.   INDICATIONS:  Z98.890 Other specified postprocedural states Z98.89 Approved by: Jhonathan Prado MD               Meds:     Prior to Admission Medications:  GABAPENTIN 400 MG Oral Cap TAKE 2 CAPSULES BY MOUTH FOUR TIMES A DAY         Current Facility-Administered Medications:  ipratropium-albuterol (DUONEB) nebulizer solution 3 (0.9)  - FENa 0.2%, consistent with pre-renal. Continue fluids and monitor      # Mantel Cell NHL  - Follows with oncology  - Has been on acalabrutinib x 1 week  - Holding in setting of TLS    # Leukocytosis  - To be expected with acalabrutinib per oncolog

## 2018-09-08 NOTE — CONSULTS
[facility]  Report of Consultation    Verena Khalil Patient Status:  Inpatient    10/9/1935 MRN [id number]   [facility] 4NW-A Attending Poly Benedict MD   Hosp Day # 2 PCP Alisa Brenner MD     REASON FOR CONSULTATION:     [facility], substance abuse   • Other Son         ms, asthma   • Diabetes Son    • Psychiatric Sister         depression   • Hypertension Son    • Diabetes Son       reports that he quit smoking about 3 years ago. His smoking use included cigarettes.  He starte rate 18, weight 62.2 kg (137 lb 2 oz), SpO2 94 %.     General: no acute distress  Heart: normal rate  Lungs: clear  Abdomen: soft  Extremities: no edema        DIAGNOSTIC WORK UP:     Laboratory Data:    Recent Results (from the past 24 hour(s))   PREPARE R 5.80 x10(6)uL    HGB 7.3 (L) 13.0 - 17.0 g/dL    HCT 23.0 (L) 37.0 - 53.0 %    PLT 30.0 (L) 150.0 - 450.0 10(3)uL    MCV 99.1 (H) 80.0 - 99.0 fL    MCH 31.5 27.0 - 33.2 pg    MCHC 31.7 31.0 - 37.0 g/dL    RDW 18.5 (H) 11.5 - 16.0 %    RDW-SD 65.1 (H) 35.1 K  5.3*  4.6  4.4  4.2   CL  101  107  109  110   CO2  19.8*  21.0*  21.0*  21.0*   ALKPHO  258*   --    --   201*   AST  36   --    --   37   ALT  18   --    --   19   BILT  0.45   --    --   0.4   TP  7.2   --    --   5.7*           Imaging:    -

## 2018-09-09 NOTE — PLAN OF CARE
GASTROINTESTINAL - ADULT    • Maintains or returns to baseline bowel function Not Progressing        C/o constipation. Prn miralax gien. No result yet.    GASTROINTESTINAL - ADULT    • Maintains adequate nutritional intake (undernourished) Progressing

## 2018-09-09 NOTE — PLAN OF CARE
HEMATOLOGIC - ADULT    • Maintains hematologic stability Progressing        Impaired Functional Mobility    • Achieve highest/safest level of mobility/gait Progressing        MUSCULOSKELETAL - ADULT    • Return mobility to safest level of function Progress

## 2018-09-09 NOTE — PROGRESS NOTES
[facility]  Progress Note    Elayne Mendiola Patient Status:  Inpatient    10/9/1935 MRN [id number]   [facility] 4NW-A Attending Wale Hui MD   [facility] Day # 3 PCP Mervin Richter MD     Subjective:  Elayne Mendiola is a(n) 82 year

## 2018-09-09 NOTE — PROGRESS NOTES
[facility]ICO[facility] Hospitalist Progress Note     [facility]      SUBJECTIVE:    Pt placed on high flow O2 overnight to 6L.  Weaned to 4L by time of my interview      OBJECTIVE:  Temp:  [97.6 °F (36.4 °C)-97.9 °F (36.6 °C)] 97.8 °F (36.6 °C)  Pulse:  [69-77] 69  Res 7/06/2018, 5:20. EDWARD , CT BRAIN OR HEAD ([id number]), 7/04/2018, 18:31. EDWARD , CT BRAIN OR HEAD ([id number]), 7/04/2018, 10:18. EDWARD , CT BRAIN OR HEAD ([id number]), 7/03/2018, 14:23. EDWARD , CT BRAIN OR HEAD ([id number]), 8/17/2018, 12:18.   INDICATIONS:  G23.6C8Y cisterns are patent. Stable chronic deep white matter ischemic changes.     Dictated by: Radha Duong MD on 8/28/2018 at 10:58     Approved by: Radha Duong MD            Ct Brain Or Head ([id number])    Result Date: 8/17/2018  PROCEDURE:  CT BRAIN the right. This report was communicated by telephone at the dictation time shown below.        Dictated by: Lu Meza MD on 8/17/2018 at 12:44     Approved by: Lu Meza MD               Meds:     Prior to Admission Medications:  GABAPENTIN 400 MG Oral C Decrease IVF rate    # REYNA  # Tumor Lysis Syndrome  - Expected with treatment  - Allopurinol   - Creatinine improving with IVF, still above baseline (0.9)  - FENa 0.2%, consistent with pre-renal. Continue fluids and monitor      # Mantel Cell NHL  - Follow

## 2018-09-09 NOTE — PLAN OF CARE
GASTROINTESTINAL - ADULT    • Maintains or returns to baseline bowel function Progressing        MUSCULOSKELETAL - ADULT    • Return mobility to safest level of function Progressing        NEUROLOGICAL - ADULT    • Absence of seizures Progressing        SA

## 2018-09-10 NOTE — PLAN OF CARE
GASTROINTESTINAL - ADULT    • Maintains or returns to baseline bowel function Progressing    • Maintains adequate nutritional intake (undernourished) Progressing        HEMATOLOGIC - ADULT    • Maintains hematologic stability Progressing        NEUROLOGICA

## 2018-09-10 NOTE — PROGRESS NOTES
[facility] Hospitalist Progress Note     Michelle Magaña Patient Status:  Inpatient    10/9/1935 MRN [id number]   [facility] 4NW-A Attending Valentín Kat MD   Hosp Day # 4 PCP Elaine Cote MD     CC: follow up    SUBJECTIVE:  No acute ev hours.    Imaging:  None new    Meds:   Scheduled Medication:  • levofloxacin  750 mg Intravenous Q24H   • Phenytoin Sodium Extended  300 mg Oral Nightly   • ipratropium-albuterol  3 mL Nebulization 6 times per day   • docusate sodium  100 mg Oral BID   • history and medications. Questions/concerns were discussed with patient and/or family by bedside. Time spent: greater than 35 minutes spent in d/w pt/family, coordination of care, and d/w staff.      Leilani Gross MD   [facility] IM Hospitalist  Pager: 6

## 2018-09-10 NOTE — CM/SW NOTE
09/10/18 1500   CM/SW Referral Data   Referral Source    Reason for Referral Discharge planning   Informant Patient   Pertinent Medical Hx   Primary Care Physician Name Dr Danisha De Leon directives?  No   Information provided

## 2018-09-10 NOTE — PROGRESS NOTES
[facility] Pharmacy Note:  Renal Adjustment for levofloxacin [facility])    Madina Schwartz is a [de-identified]year old male who has been prescribed levofloxacin (LEVAQUIN) 750 mg every 48 hrs.   CrCl is estimated creatinine clearance is 52.1 mL/min (based on SCr of 1.09 mg/d

## 2018-09-10 NOTE — PROGRESS NOTES
[facility]  Progress Note    Mervin Gordon Patient Status:  Inpatient    10/9/1935 MRN [id number]   [facility] 4NW-A Attending Yolande Howard MD   [facility] Day # 4 PCP Gabe Sterling MD     Subjective:  Mervin Gordon is a(n) 82 year planning  Pending clinical improvement.       We will follow.     Thank you for allowing me to participate in the care of Mr. Radha Astudillo MD FACP  Hematology-Oncology

## 2018-09-11 NOTE — PHYSICAL THERAPY NOTE
IP PT on hold per PT protocol as pt Hg 6.7 and per resp note, ABG indicate hemoglobin of 5.4. Will re-attempt as appropriate and as schedule permits. PM IP PT attempted as pt's Hg 7.7.  Rn in agreement, pt found on bipap, pulm MD arriving to assess pt and

## 2018-09-11 NOTE — PROGRESS NOTES
[facility]  Progress Note    Madina Schwartz Patient Status:  Inpatient    10/9/1935 MRN [id number]   [facility] 4NW-A Attending Pawan Angelo MD   [facility] Day # 5 PCP Yrn Guo MD     Subjective:  Madina Schwartz is a(n) 82 year 4.0 - 13.0 x10(3) uL    RBC 2.18 (L) 3.80 - 5.80 x10(6)uL    HGB 6.7 (LL) 13.0 - 17.0 g/dL    HCT 21.5 (L) 37.0 - 53.0 %    PLT 29.0 (L) 150.0 - 450.0 10(3)uL    MCV 98.6 80.0 - 99.0 fL    MCH 30.7 27.0 - 33.2 pg    MCHC 31.2 31.0 - 37.0 g/dL    RDW 17.8 ( METABOLIC PANEL (8)    Collection Time: 09/11/18 10:13 AM   Result Value Ref Range    Glucose 91 70 - 99 mg/dL    Sodium 138 136 - 144 mmol/L    Potassium 4.4 3.6 - 5.1 mmol/L    Chloride 109 101 - 111 mmol/L    CO2 23.0 22.0 - 32.0 mmol/L    Anion Gap 6 0 Time: 09/11/18 11:49 AM   Result Value Ref Range    ABG pH 7.43 7.35 - 7.45    ABG pCO2 32 (L) 35 - 45 mm Hg    ABG pO2 72 (L) 80 - 105 mm Hg    ABG HCO3 21.0 (L) 22.0 - 26.0 mEq/L    ABG Base Excess -2.9     ABG O2 Saturation 95 92 - 100 %    Calculated O

## 2018-09-11 NOTE — PLAN OF CARE
GASTROINTESTINAL - ADULT    • Maintains or returns to baseline bowel function Progressing    • Maintains adequate nutritional intake (undernourished) Progressing        HEMATOLOGIC - ADULT    • Maintains hematologic stability Progressing        Impaired Fu

## 2018-09-11 NOTE — RESPIRATORY THERAPY NOTE
Called to patients room for patient distress. Attempted to place patient on Bipap and could not due to faulty o2 connection. Patient was safely on 6l o2 tank. Notified nurse that he needs to be moved to a different room, due to broken o2 connector.     Tra

## 2018-09-11 NOTE — PROGRESS NOTES
[facility] Hospitalist Progress Note     Kael Nunn Patient Status:  Inpatient    10/9/1935 MRN [id number]   [facility] 4NW-A Attending Lianet Mary MD   Hosp Day # 5 PCP Yovanny Mistry MD     CC: follow up    SUBJECTIVE:  Pt became i Recent Labs   Lab  09/06/18   0934  09/08/18   0650  09/10/18   0527   ALT  18  19   --    AST  36  37   --    ALB  2.9*  2.2*   --    LDH  666*  434*  433*       No results for input(s): PGLU in the last 168 hours.     Imaging:  CXR: CONCLUSION:  Sig dehydration     FEN: +mIVF, replete lytes PRN, renal diet w/ supplements  DVT ppx: SCDs  Deconditioning prevention: PT/OT  Dispo: IICU, dispo pending clinical imrpovement  Code: FULL     Outpatient records reviewed confirming patient's medical history and

## 2018-09-11 NOTE — PLAN OF CARE
Patient noted with increasing Hypoxia 86-88% at 5LO2 HF. Dr. Rey Red made aware, ordered Pulmonology Consult. Dr. Srinivas Rhodes notified, ordered BIPAP with good result, O2 sats went up to 99%. ABG done as well, THgb 5.4. Repeat CBC showed Hgb 6.7.  Dr. Hoover Presume

## 2018-09-11 NOTE — CONSULTS
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 Disp:  Rfl:    B Complex-C-Folic Acid Oral Tab Take 1 tablet by mouth daily. Disp:  Rfl:    gabapentin 400 MG Oral Cap Take 800 mg by mouth 4 (four) times daily.  Disp:  Rfl:    Calcium Phenytoin Sodium Extended (DILANTIN) ER cap 200 mg, 200 mg, Oral, Daily     Review of Systems:  Constitutional: denies weight loss, fevers, chills, night sweats, + fatigue  HEENT: denies vision or hearing changes, eye pain, tinnitus, hearing loss, sore thr NAD                          HEENT: oropharynx clear without erythema or exudates, moist mucous membranes                          Lungs: Clear to auscultation bilaterally, no wheezes or crackles                           Chest wall: No tenderness or defor failure: 2/2 volume overload +/- Pneumonia. Anemia likely also be contributing.   Pt currently has marked Abdominal distention which can also exacerbate dyspnea via restrictive physiology, will work up as noted below   -continue bipap for now - 12/5  -will

## 2018-09-12 NOTE — PLAN OF CARE
MUSCULOSKELETAL - ADULT    • Return mobility to safest level of function Not Progressing        RESPIRATORY - ADULT    • Achieves optimal ventilation and oxygenation Not Progressing          GASTROINTESTINAL - ADULT    • Maintains adequate nutritional [place]

## 2018-09-12 NOTE — PROGRESS NOTES
[facility] Hospitalist Progress Note     Janette Stein Patient Status:  Inpatient    10/9/1935 MRN [id number]   [facility] 4NW-A Attending Kerrin Cowden, MD   Hosp Day # 6 PCP Renaldo Taylor MD     CC: follow up    SUBJECTIVE:  Still requi 0527   ALT  18  19   --    AST  36  37   --    ALB  2.9*  2.2*   --    LDH  666*  434*  433*       No results for input(s): PGLU in the last 168 hours.     Imaging:  CXR: from today results pending    Meds:   Scheduled Medication:  • MethylPREDNISolone Sodi diet  DVT ppx: SCDs  Deconditioning prevention: PT/OT  Dispo: IICU, dispo pending clinical imrpovement  Code: FULL     Outpatient records reviewed confirming patient's medical history and medications.      Questions/concerns were discussed with patient and

## 2018-09-12 NOTE — PHYSICAL THERAPY NOTE
IP PT on hold per d/w RN. Pt not appropriate at this time for OOB activity. Will re-attempt 9/13/18 as appropriate and as schedule permits.

## 2018-09-12 NOTE — OCCUPATIONAL THERAPY NOTE
IP OT on hold per PT d/w RN. Pt not appropriate at this time for OOB activity.  Will re-attempt 9/13/18 as appropriate and as schedule permits

## 2018-09-12 NOTE — PROGRESS NOTES
Pulmonary Progress Note      NAME: Crystal Goldman - ROOM: [facility]/7-D - MRN: [id number] - Age: [de-identified]year old - : 10/9/1935    Assessment/Plan:  1. Acute hypoxemic respiratory failure: 2/2 volume overload +/- Pneumonia.     -CXR with significant bilateral inf distress. HEENT: Normocephalic atraumatic. Lips, mucosa, and tongue normal.  No thrush noted. Neck: supple without mass   Lungs: rales and ronchi   Chest wall: No tenderness or deformity. Heart: Regular rate and rhythm, normal S1S2, above murmur.    Ab

## 2018-09-12 NOTE — PROGRESS NOTES
[facility]  Progress Note    Brooklynn Ray Patient Status:  Inpatient    10/9/1935 MRN [id number]   [facility] 4NW-A Attending Jadon Mccarthy MD   [address] Day # 6 PCP Cole Duong MD     Subjective:  Brooklynn Ray is a(n) 82 year 7.7 (L) 13.0 - 17.0 g/dL    HCT 24.2 (L) 37.0 - 53.0 %    PLT 22.0 (L) 150.0 - 450.0 10(3)uL    MCV 96.0 80.0 - 99.0 fL    MCH 30.6 27.0 - 33.2 pg    MCHC 31.8 31.0 - 37.0 g/dL    RDW 18.6 (H) 11.5 - 16.0 %    RDW-SD 64.4 (H) 35.1 - 46.3 fL    Neutrophil A Methemoglobin 0.5 0.4 - 1.5 % SAT    Allens Test Positive     Sample Site Left Radial     ABG Device Bi-PAP     ABG Vent Mode Spontaneous/Timed     FIO2 50 %    Vent Rate 16 /min    Inspiratory Pressure 12.0 cm H2O    Expiratory Pressure 5.0 cm H2O   NH over 7      3. TLS - improved creatinine and potasium   Monitor labs  Continue on allopurinol, uric acid has been stable/appropriate.       4. Hypoxemia  On oxygen  On antibiotics for pneumonia.       We will follow.     Thank you for allowing me to partici

## 2018-09-12 NOTE — PROGRESS NOTES
No change overnight. Remains on BIPAP , wanted to take it off, explained need for it to remain on. Frequent drinks given as mouth dry. . Right port intact Seizure precautions maintained.

## 2018-09-13 NOTE — PLAN OF CARE
Bipap on overnight , frequently pulls off and needs to be replaced. Port intact voiding . Nebs per Resp.  Slept

## 2018-09-13 NOTE — PROGRESS NOTES
[facility] Hospitalist Progress Note     Luke Check Patient Status:  Inpatient    10/9/1935 MRN [id number]   [facility] 4NW-A Attending Stephenie Ambriz MD   Hosp Day # 7 PCP Ivania Gaspar MD     CC: follow up    SUBJECTIVE:  Feeling bet --    AST  37   --    ALB  2.2*   --    LDH  434*  433*     Imaging:  CXR: from today results pending    Meds:   Scheduled Medication:  • Acalabrutinib  100 mg Oral 2 times per day   • Umeclidinium Bromide  1 puff Inhalation Daily   • MethylPREDNISolone S SCDs  Deconditioning prevention: PT/OT  Dispo: stable on floor  Code: FULL       Rosenda Landaverde  [facility] Hospitalist  [phone]

## 2018-09-13 NOTE — CM/SW NOTE
PT recommending Acute rehab, PM&R ordered. Referral sent via [address]. CM to follow up.     Elizabeth Callahan MSN RN, CTL/  P: [phone]

## 2018-09-13 NOTE — PROGRESS NOTES
30 BRIAN Greco Patient Status:  Inpatient    10/9/1935 MRN [id number]   [facility] 4NW-A Attending Collin Jensen MD   Hosp Day # 7 PCP Florina Schwartz MD     Pulm / Critical Care Progress Note     S: pt states he positive BS.    Extremity: no edema         Recent Labs   Lab  09/11/18   0155  09/11/18   1013  09/12/18   0517   WBC  41.0*  23.9*  41.1*   HGB  6.7*  7.7*  7.8*   HCT  21.5*  24.2*  24.1*   PLT  29.0*  22.0*  30.0*     No results for input(s): INR in the

## 2018-09-13 NOTE — OCCUPATIONAL THERAPY NOTE
OCCUPATIONAL THERAPY EVALUATION - INPATIENT     Room Number: 686/462-C  Evaluation Date: 9/13/2018  Type of Evaluation: Initial  Presenting Problem: tumor lysis, anemia    Physician Order: IP Consult to Occupational Therapy  Reason for Therapy: ADL/IADL Dy BRONCHOSCOPY; N/A      Comment:  Performed by Konrad Smith MD at [address] [facility] ENDOSCOPY  No date: CABG  2000: CABG, ARTERIAL, SINGLE  2000: CAROTID ENDARTERECTOMY      Comment:  right  No date: CATARACT      Comment:  od 10yrs ago  7/5/2018: [address] identified. Pt's wife would like him to get rehab at [facility].     OBJECTIVE  Precautions: Bed/chair alarm  Fall Risk: High fall risk    WEIGHT BEARING RESTRICTION  Weight Bearing Restriction: None                PAIN ASSESSMENT  Ratin  Location: N/A Score (AM-PAC Scale): 37.26  CMS Modifier (G-Code): CK    FUNCTIONAL TRANSFER ASSESSMENT  Supine to Sit : Supervision  Sit to Stand: Moderate assistance    Skilled Therapy Provided: Pt was found in bed in a semi-supine position.  Pt agreeable and motivated level and would benefit from skilled inpatient OT to address the above deficits, maximizing patient’s ability to return safely to his prior level of function.     Patient Complexity  Occupational Profile/Medical History MODERATE - Expanded review of history

## 2018-09-13 NOTE — PLAN OF CARE
Assumed care @ 0730. Respirations non-labored, lungs sound diminished, with occasional non-productive cough, O2 sat 89-93% on O2 10 litershigh flow  per nasal cannula. Patient desaturates  occasioanlly, O2 saturation monitored, O2 titrated as needed.   Chinedu Duong

## 2018-09-13 NOTE — PROGRESS NOTES
[facility]  Progress Note    Chema Limb Patient Status:  Inpatient    10/9/1935 MRN [id number]   [facility] 4NW-A Attending Alma Rosa Mckeon MD   [facility] Day # 7 PCP Candelario Dallas MD     Subjective:  Chema Limb is a(n) 82 year acid has been stable/appropriate. 4. Hypoxemia - fluid overload   On oxygen  On antibiotics for pneumonia.       We will follow.     Thank you for allowing me to participate in the care of Mr. Nadiya Garcia MD FACP  Hematology-Oncology

## 2018-09-13 NOTE — CM/SW NOTE
RN stating PT is recommending [facility]. SW spoke w/pt about this. Pt stated, \"I don't want to go. \" Pt agreeable to think about it. DANIELLE left [facility] list in the pt's room. Pt and family to review.

## 2018-09-13 NOTE — PHYSICAL THERAPY NOTE
PHYSICAL THERAPY TREATMENT NOTE - INPATIENT    Room Number: 776/038-L     Session: 1   Number of Visits to Meet Established Goals: 5    Presenting Problem: anemia and management of tumor lysis    History related to current admission: Pt is [de-identified]year old mal List  Active Problems:    NHL (non-Hodgkin's lymphoma) [facility])      Past Medical History  Past Medical History:   Diagnosis Date   • Atherosclerosis of coronary artery    • Brain bleed ([facility] [address] 07/2018   • CANCER     NHL mantle cell type   • Cataract     [employer]  No date: THYROIDECTOMY      Comment:  partial    SUBJECTIVE  \"my nose and throat are so dry, it's hard to wear that mask\"     Patient’s self-stated goal is to get out of bed    OBJECTIVE  Precautions: Bed/chair alarm    WEIGHT BEARING RESTRICTION  W RW. Pt reports feeling unsteady on feet and requires mod A to t/f bed>BS chair c RW. Pt performed seated therex for BLE with cues for technique and O2 sats monitored. Pt left in chair, needs met, d/w pt and spouse d/c recs and rationale.      THERAPEUTIC training  Rehab Potential : Good  Frequency (Obs): 5x/week    CURRENT GOALS   Goal #1 Patient is able to negotiate 3-5 steps with use of railing and CGA.      Goal #2 Patient is able to ambulate 350 feet with assist device: walker - rolling at assistance l

## 2018-09-14 NOTE — PHYSICAL THERAPY NOTE
PHYSICAL THERAPY TREATMENT NOTE - INPATIENT    Room Number: 905/175-F     Session: 2  Number of Visits to Meet Established Goals: 5    Presenting Problem: anemia and management of tumor lysis    History related to current admission: Pt is [de-identified]year old male List  Active Problems:    NHL (non-Hodgkin's lymphoma) [facility])      Past Medical History  Past Medical History:   Diagnosis Date   • Atherosclerosis of coronary artery    • Brain bleed ([facility] [address] 07/2018   • CANCER     NHL mantle cell type   • Cataract     [employer]  No date: THYROIDECTOMY      Comment:  partial    SUBJECTIVE  \"that felt good\"     Patient’s self-stated goal is to walk more      OBJECTIVE  Precautions: Bed/chair alarm    WEIGHT BEARING RESTRICTION  Weight Bearing Restriction: None Min A sit>stand. Pt amb around bed to BR and required min A toilet t/f. Total A stefano care p BM. Min A to pull up brief. Cues for anterior weight shift and min A for bal.   Pt gait trained c min A RW and chair follow. Pt returned to sit c CGA.   Pt wheeled b

## 2018-09-14 NOTE — PLAN OF CARE
HEMATOLOGIC - ADULT    • Maintains hematologic stability Progressing        NEUROLOGICAL - ADULT    • Absence of seizures Progressing        RESPIRATORY - ADULT    • Achieves optimal ventilation and oxygenation Progressing        SAFETY ADULT - FALL    • F

## 2018-09-14 NOTE — PROGRESS NOTES
desating to 87% on change shift;RT around and adjusted flow to 14-45;  Now at 12l/hiflow and sating from 90-92^denies any SOB when asked;talking over the phone at this time  Gaby Sanders aware of sats  =ambulated in hallway with [facility]. and tolerate

## 2018-09-14 NOTE — CM/SW NOTE
[facility] has accepted patient to their acute facility but will not have bed available today. Per  Telly An they may have bed over the weekend. CM will keep her updated on discharge plans.     Ashlee German MSN RN, [facility]/  P: [phone]    11:15  Informed

## 2018-09-14 NOTE — PROGRESS NOTES
[facility] Hospitalist Progress Note     Toñito Ozuna Patient Status:  Inpatient    10/9/1935 MRN [id number]   [facility] 4NW-A Attending Joan Trujillo MD   Hosp Day # 8 PCP Gerson Rosales MD     CC: follow up    SUBJECTIVE:  He states h 37   --    ALB  2.2*   --    LDH  434*  433*     Imaging:  CXR: from today results pending    Meds:   Scheduled Medication:  • Umeclidinium Bromide  1 puff Inhalation Daily   • Acalabrutinib  100 mg Oral 2 times per day   • MethylPREDNISolone Sodium Succ dehydration     FEN: renal diet  DVT ppx: SCDs  Deconditioning prevention: PT/OT  Dispo: stable on floor  Code: FULL       Supriya Layne  [facility] Hospitalist  [phone]

## 2018-09-14 NOTE — OCCUPATIONAL THERAPY NOTE
OCCUPATIONAL THERAPY TREATMENT NOTE - INPATIENT     Room Number: 702/711-D  Session: 1   Number of Visits to Meet Established Goals: 5    Presenting Problem: tumor lysis, anemia    History related to current admission: Pt is [de-identified]year old male admitted on 9/ ARTERIAL, SINGLE  2000: CAROTID ENDARTERECTOMY      Comment:  right  No date: CATARACT      Comment:  od 10yrs ago  7/5/2018: [id number]; Right      Comment:  Performed by Konstantin Plasencia MD at [address]  No date: LAPAROSCOPY, SURGICAL PROSTA 37.26  CMS Modifier (G-Code): CK    FUNCTIONAL TRANSFER ASSESSMENT  Supine to Sit : Not tested  Sit to Stand: Not tested    Skilled Therapy Provided: Pt was found sitting up in a chair. Pt agreeable to therapy.  Pt completed LBD to don/doff socks with super level.     The patient is functioning below his previous functional level and would benefit from skilled inpatient OT to address the above deficits, maximizing patient’s ability to return safely to his prior level of function.       OT Discharge Recommendat

## 2018-09-14 NOTE — CONSULTS
.30 BRIAN Greco Patient Status:  Inpatient    10/9/1935 MRN [id number]   [facility] 4NW-A Attending Donald Casillas MD   [address] Day # 7 PCP Yrn Guo MD     Patient Identification  Madina Schwartz is a [de-identified] year spouse / significant other and children / family / friends to help, patient and daughter  Home Environment / Accessibility: 1-story house/ trailer, number of outside stairs: 7  Current Functional Status: Maximal assist for transfers.   Gait 2 feet maximal a EXTRACAP,INSERT LENS      Comment:  right w IOL  10/5/2009: RIGHT PHACOEMULSIFICATION OF CATARACT WITH INTRAOCULAR   LENS IMPLANT [id number]; Right      Comment:  Performed by Aria Ambrosio MD at [address] [place] [address],[address]  No date: THYROID tab 100 mg 100 mg Oral Nightly   atenolol (TENORMIN) tab 75 mg 75 mg Oral Daily Beta Blocker   gabapentin (NEURONTIN) cap 800 mg 800 mg Oral QID   Phenytoin Sodium Extended (DILANTIN) ER cap 200 mg 200 mg Oral Daily       Social History    Tobacco Use enlargement/tenderness/nodules. Lungs:   Resonant, clear breath sounds, quiet accessory muscles. Chest wall:  No tenderness or deformity. Cardiovascular:  Heart with regular rate rhythm, no murmurs appreciated. Radial and pedal pulses good.  No cy rehabilitation, working with PT/OT to upgrade present functional status, provide family training, assess home equipment and assistive device needs.   Acute rehab likely a better option with higher medical acuity with his acute respiratory failure, pleural e

## 2018-09-14 NOTE — PROGRESS NOTES
30 BRIAN Greco Patient Status:  Inpatient    10/9/1935 MRN [id number]   [facility] 4NW-A Attending Monalisa Arce MD   Hosp Day # 8 PCP Yesica Siddiqui MD     Pulm / Critical Care Progress Note     S: pt states he soft, non-tender, non-distended, positive BS.    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  Dispo: remains critically ill, though trending towards improvement    Cc time 40 min           Pam Thakur M.D.  [facility] pulmonary / critical care  9/14/2018  10:39 AM

## 2018-09-14 NOTE — PROGRESS NOTES
[facility]  Progress Note    Freddy Sanders Patient Status:  Inpatient    10/9/1935 MRN [id number]   [facility] 4NW-A Attending Tamar Roberts MD   [address] Day # 8 PCP Karen Chappell MD     Subjective:    Freddy Sanders is a(n) [de-identified] ye 17.0 g/dL    HCT 24.3 (L) 37.0 - 53.0 %    PLT 23.0 (L) 150.0 - 450.0 10(3)uL    MCV 94.9 80.0 - 99.0 fL    MCH 30.5 27.0 - 33.2 pg    MCHC 32.1 31.0 - 37.0 g/dL    RDW 17.3 (H) 11.5 - 16.0 %    RDW-SD 58.9 (H) 35.1 - 46.3 fL    Neutrophil Absolute Prelim

## 2018-09-15 NOTE — PROGRESS NOTES
[facility] Hospitalist Progress Note     April España Patient Status:  Inpatient    10/9/1935 MRN [id number]   [facility] 4NW-A Attending Jackelin Houston MD   Hosp Day # 9 PCP Juan Alberto Sarabia MD     CC: follow up    SUBJECTIVE:    Still 8-1 --   32   ALB   --   2.1*   LDH  433*   --      Imaging:  CXR: from today results pending    Meds:   Scheduled Medication:  • furosemide  20 mg Oral Daily   • [START ON 9/16/2018] predniSONE  40 mg Oral Daily with breakfast   • Umeclidinium Bromide  1 puff resolved  - likely secondary to dehydration     FEN: renal diet  DVT ppx: SCDs  Deconditioning prevention: PT/OT  Dispo: stable on floor  Code: FULL       Nancy Moses  [facility] Hospitalist  [phone]

## 2018-09-15 NOTE — PLAN OF CARE
RESPIRATORY - ADULT    • Achieves optimal ventilation and oxygenation Progressing          NEUROLOGICAL - ADULT    • Absence of seizures Progressing          SAFETY ADULT - FALL    • Free from fall injury Progressing          GASTROINTESTINAL - ADULT    •

## 2018-09-15 NOTE — PROGRESS NOTES
[facility]  Progress Note    Chema Limb Patient Status:  Inpatient    10/9/1935 MRN [id number]   [facility] 4NW-A Attending Alma Rosa Mckeon MD   [facility] Day # 9 PCP Candelario Dallas MD     Subjective:    Chema Limb is a(n) [de-identified] ye AM   Result Value Ref Range    Magnesium 2.3 1.8 - 2.5 mg/dL   PHOSPHORUS    Collection Time: 09/15/18  6:21 AM   Result Value Ref Range    Phosphorus 4.8 2.5 - 4.9 mg/dL   CBC W/ DIFFERENTIAL    Collection Time: 09/15/18  6:21 AM   Result Value Ref Range

## 2018-09-15 NOTE — PLAN OF CARE
Assumed care @ 0730. Patient received on O2 15 liters per nasal cannula. Respirations non-labored, lungs sound diminished, O2 sat 96% on O2 15 liters per nasal cannula. O2 weaned slowly, now on O2 6l?nc, O2 sat 92%. Patient denies discomfort.  Patient afebr

## 2018-09-15 NOTE — PROGRESS NOTES
Pulmonary Progress Note        NAME: Elsa Huerta - ROOM: 176/336-Y - MRN: [id number] - Age: [de-identified]year old - : 10/9/1935    Past Medical History:   Diagnosis Date   • Atherosclerosis of coronary artery    • Brain bleed ([facility] [address] 2018   • CANCER     NHL m 97.9 °F (36.6 °C) (Oral)   Resp 20   Wt 150 lb 3.2 oz (68.1 kg)   SpO2 97%   BMI 22.84 kg/m²   General:  Alert, no distress   Lungs:   Clear to auscultation bilaterally.    Heart:   S1, S2 normal,    Abdomen:   Soft, non-tender, non distended   Extremities: acalabrutinib  8.  Dispo: full code  -will follow           Alexx Tracey

## 2018-09-16 NOTE — PROGRESS NOTES
Pulmonary Progress Note        NAME: Gracie Fernando - ROOM: [facility]-F - MRN: [id number] - Age: [de-identified]year old - : 10/9/1935    Past Medical History:   Diagnosis Date   • Atherosclerosis of coronary artery    • Brain bleed ([facility] [address] 2018   • CANCER     NHL m Temp 97 °F (36.1 °C) (Axillary)   Resp 18   Wt 150 lb 3.2 oz (68.1 kg)   SpO2 98%   BMI 22.84 kg/m²   General:  Alert, no distress   Lungs:   Clear to auscultation bilaterally.    Heart:   S1, S2 normal,    Abdomen:   Soft, non-tender, non distended   Extre

## 2018-09-16 NOTE — PLAN OF CARE
RESPIRATORY - ADULT    • Achieves optimal ventilation and oxygenation Progressing        SAFETY ADULT - FALL    • Free from fall injury Progressing        PT A/O, 89% ON 5L, LUNGS DIMINISHED, AFTER TALKING ON PHONE, DESATING TO LOW 80'S, INCREASED O2 TO 9

## 2018-09-16 NOTE — PROGRESS NOTES
[facility]  Progress Note    Rafaela Quinonez Patient Status:  Inpatient    10/9/1935 MRN [id number]   [facility] 4NW-A Attending Peyton Carrillo MD   [address] Day # 10 PCP Yesica Siddiqui MD     Subjective:    Rafaela Quinonez is a(n) [de-identified] y GFR, -American 57 (L) >=60    AST 49 (H) 15 - 41 U/L    Alt 22 17 - 63 U/L    Alkaline Phosphatase 179 (H) 45 - 117 U/L    Bilirubin, Total 0.4 0.1 - 2.0 mg/dL    Total Protein 5.5 (L) 6.1 - 8.3 g/dL    Albumin 2.0 (L) 3.5 - 4.8 g/dL    Globulin  3. admisson  Now back on treatment and need to monitor labs  Continue on allopurinol, uric acid has been stable  K is pending       4. Hypoxemia - fluid overload   On oxygen  On antibiotics for pneumonia.       We will follow.     Thank you for allowing me to

## 2018-09-16 NOTE — PLAN OF CARE
Pt is alert and oriented x 4. Vitals stable. Weaned off O2 from 6L high flow Nasal cannula to 4L HFNC.  Charlie short of breath and pt yuan not look SOB or in any distress, due to poor pleth O2 sats showed 86% at times but after adjusting the pulse ox probe pt

## 2018-09-16 NOTE — PROGRESS NOTES
[facility] Hospitalist Progress Note     Darcy Marin Patient Status:  Inpatient    10/9/1935 MRN [id number]   [facility] 4NW-A Attending Eli Engle MD   [address] Day # 10 PCP Rosalinda Nation MD     CC: follow up    SUBJECTIVE:    Oxygen r PHOS   --    --    --   4.8   --    --    GLU  108*  104*  139*  152*  196*  81       Recent Labs   Lab  09/10/18   0527  09/15/18   0621  09/16/18   0650   ALT   --   22  22   AST   --   32  49*   ALB   --   2.1*  2.0*   LDH  433*   --   671*     Sarah now dced     # Leukocytosis  - To be expected with acalabrutinib per oncology     # Recent Subdural Hematoma  - No anticoagulants for now     # Hyponatremia  - Mild, resolved     FEN: renal diet  DVT ppx: SCDs  Deconditioning prevention: PT/OT  Dispo: [facility]

## 2018-09-17 NOTE — PROGRESS NOTES
[facility] Hospitalist Progress Note     Chema Limb Patient Status:  Inpatient    10/9/1935 MRN [id number]   [facility] 4NW-A Attending Adelso Melissa MD   [facility] Day # 6 PCP Candelario Dallas MD     CC: follow up    SUBJECTIVE:  Feels okay Recent Labs   Lab  09/15/18   0621  09/16/18   0650   ALT  22  22   AST  32  49*   ALB  2.1*  2.0*   LDH   --   671*     Imaging:  CXR: 9/17-- not substantially improved from previous    Meds:   Scheduled Medication:  • furosemide  20 mg Oral Daily oncology     # Recent Subdural Hematoma  - No anticoagulants for now     # Hyponatremia  - Mild, resolved     FEN: renal diet  DVT ppx: SCDs  Deconditioning prevention: PT/OT  Dispo: stable on floor  Code: FULL

## 2018-09-17 NOTE — DIETARY NOTE
NUTRITION FOLLOW UP ASSESSMENT    Pt is at moderate nutrition risk. Pt does not meet malnutrition criteria.     NUTRITION DIAGNOSIS/PROBLEM:    Unintentional weight loss related to physiological causes as evidenced by reported 10# wt loss in 1 week - ongoin happy to drink them. Offered HS snack, pt accepted. No n/v or stomach pain. ANTHROPOMETRICS:  Ht:  5'8\"  Wt: 68.1 kg (150 lb 3.2 oz). This is 89 % of IBW  BMI: Body mass index is 22.84 kg/m².   IBW: 70 kg  Usual Body Wt: 70 kg    WEIGHT HISTORY:   Wt R

## 2018-09-17 NOTE — PLAN OF CARE
Pt is alert and oriented x 4. Vitals stable. No C/o pain or discomfort. Weaned off O2 to 3L High Flow form 6 L HFNC as during night shift pt needed more O2. Appetite is good. Platelet transfused with no reaction as ordered by .  Pt had Right Thorace

## 2018-09-17 NOTE — CM/SW NOTE
Spoke with Altagracia Bethea with [facility]- pt is accepted to acute rehab but [facility] will need parameters for WBC from oncology prior to d/c. She also notes that  does not have medication- Acalabrutinib; pt would have to bring this to [facility]. Spoke with RN.   No d/c expected

## 2018-09-17 NOTE — PROCEDURES
Thoracentesis Procedure Note  : Dr. Shirley Andrews  Indication: right pleural effusion  Location: right side  Pre-procedure: Patient was identified in ultrasound suite 1. Consent was obtained and documented from the patient. Procedural pause performed.   U

## 2018-09-17 NOTE — PROGRESS NOTES
[facility]  Progress Note    Darcy Marin Patient Status:  Inpatient    10/9/1935 MRN [id number]   [facility] 4NW-A Attending Ganga Ochoa MD   [facility] Day # 11 PCP Rosalinda Nation MD     Subjective:    Darcy Marin is a(n) [de-identified] y Calculated Osmolality 297 (H) 275 - 295 mOsm/kg    GFR, Non- 62 >=60    GFR, -American 72 >=60          Recent Labs   Lab  09/14/18   0650  09/15/18   0621  09/16/18   0650   RBC  2.56*  2.50*  2.50*   HGB  7.8*  7.7*  7.7*   HCT

## 2018-09-17 NOTE — PROGRESS NOTES
30 N. Starajinder Patient Status:  Inpatient    10/9/1935 MRN [id number]   [facility] 4NW-A Attending Shirley Keenan MD   Hosp Day # 6 PCP Cole Duong MD     SUBJECTIVE: Pt complains of some dyspnea.     OBJECTIVE:  BP 1 Daily Beta Blocker  •  gabapentin (NEURONTIN) cap 800 mg, 800 mg, Oral, QID  •  Phenytoin Sodium Extended (DILANTIN) ER cap 200 mg, 200 mg, Oral, Daily      Physical Exam:                          TIMNOXY: enma AGARWAL, in NAD                         independently visualized all relevant chest imaging in PACS. I agree with the radiology interpretation except where noted. ASSESSMENT/PLAN:  1.  Acute hypoxemic respiratory failure: 2/2 volume overload + Pneumonia vs pneumonitis.  He is better  -wean O2

## 2018-09-18 NOTE — PROGRESS NOTES
[facility]  Progress Note    Germaine Haro Patient Status:  Inpatient    10/9/1935 MRN [id number]   [facility] 4NW-A Attending Marcus Crockett MD   [facility] Day # 12 PCP Jordyn Michaud MD     Subjective:    Germaine Haro is a(n) [de-identified] y Protein Pleural Fld 3.3 g/dL   GLUCOSE PLEURAL FLUID    Collection Time: 09/17/18  2:54 PM   Result Value Ref Range    Glucose Pleural Fld 185 mg/dL   PLEURAL FLUID PH    Collection Time: 09/17/18  2:54 PM   Result Value Ref Range    Pleural Fluid pH 7.46 Manual 1 %    Basophil % Manual 0 %    Other Cell, Manual 58     NRBC 1     Total Cells Counted 100     RBC Morphology See morphology below (A) Normal    Platelet Morphology Normal Normal    Macrocytosis Small (A) (none)    Microcytosis Small (A) (none) Adelina Barroso MD FACP  Hematology-Oncology

## 2018-09-18 NOTE — PROGRESS NOTES
30 NCharito Greco Patient Status:  Inpatient    10/9/1935 MRN [id number]   [facility] 4NW-A Attending Salvatore Pino MD   Hosp Day # 12 PCP Rand Turcios MD     SUBJECTIVE: Pt denies SOB this afternoon.   Receiving 1 unit P Daily  •  Amitriptyline HCl (ELAVIL) tab 100 mg, 100 mg, Oral, Nightly  •  atenolol (TENORMIN) tab 75 mg, 75 mg, Oral, Daily Beta Blocker  •  gabapentin (NEURONTIN) cap 800 mg, 800 mg, Oral, QID  •  Phenytoin Sodium Extended (DILANTIN) ER cap 200 mg, 200 m 2.50*   --   2.23*  2.29*   HGB  7.7*   --   6.6*  6.7*   HCT  25.4*   --   21.5*  22.0*   MCV  101.6*   --   96.4  96.1   MCH  30.8   --   29.6  29.3   MCHC  30.3*   --   30.7*  30.5*   RDW  17.4*   --   17.0*  17.2*   NEPRELIM  38.58*   --   58.38*  49.2

## 2018-09-18 NOTE — PROGRESS NOTES
Assumed care of pt at 2330, pt up and in bathroom with walker and assist, safety, fall and seizure precautions maintained, denies pain or distress, BPAP at night, was on but pt requesting for it to be off, now back to 3 liters HFNC, s/p thoracentesis earli

## 2018-09-18 NOTE — PHYSICAL THERAPY NOTE
IP PT on hold, per PT protocol,  pending blood transfusion d/t Hg 6.7. RN in agreement. Will re-attempt as schedule permits.

## 2018-09-18 NOTE — PROGRESS NOTES
[facility] Hospitalist Progress Note     Abi Muir Patient Status:  Inpatient    10/9/1935 MRN [id number]   [facility] 4NW-A Attending Robin Ybarra MD   Hosp Day # 15 PCP Jose Bone MD     CC: follow up    SUBJECTIVE:  Doing okay 7.5*   --   7.3*  6.7*   --    MG  2.3  2.3   --   1.9   --    --    --    --    --    PHOS   --   4.8   --    --    --    --    --    --    --    GLU  139*  152*  196*  81   --    --   89  90   --        Recent [employer]   Lab  09/15/18   0621  09/16/18   0650 pseudohyperkalemia 2/2 severe leukocytosis.  K appeared in normal range with repeat checks  - Allopurinol      # REYNA- resolved  - initial  FENa 0.2%, consistent with pre-renal  - resolved with IVF which are now dced     # Leukocytosis  - To be expected with

## 2018-09-18 NOTE — PROGRESS NOTES
Notified by lab of critical K+ level of 8.2, lab requesting levels  to be redrawn. Dr. Alex Mercedes on floor and made aware, ordered Potasium serum and plasma levels, labs redrawn and sent down, am RN made aware.

## 2018-09-18 NOTE — PLAN OF CARE
HEMATOLOGIC - ADULT    • Maintains hematologic stability Progressing          Impaired Activities of Daily Living    • Achieve highest/safest level of independence in self care Progressing          Impaired Functional Mobility    • Achieve highest/safest l

## 2018-09-18 NOTE — CM/SW NOTE
LOS #12 days~HGB 6.7, to receive 1 unit of blood today. Elevated WBC & K+ level. To have repeat lab work done tonight at [facility]. When medically stable, to discharge to [facility] acute rehab.      Eva , 09/18/18, 12:28 PM

## 2018-09-19 NOTE — PROGRESS NOTES
30 BRIAN Greco Patient Status:  Inpatient    10/9/1935 MRN [id number]   [facility] 4NW-A Attending Jacque Sullivan MD   Hosp Day # 15 PCP Renaldo Taylor MD     Pulm / Critical Care Progress Note     S: Pt states he feels positive BS.    Extremity: no edema         Recent Labs   Lab  09/16/18   0650  09/17/18   1352  09/18/18   0555  09/18/18   0740  09/18/18 2000   WBC  115.8*   --   158.9*  163.2*   --    HGB  7.7*   --   6.6*  6.7*  7.6*   HCT  25.4*   --   21.5*  22.0* code  -will follow   -d/c planning; to [facility] when ready        Oliva Luna M.D.  [facility] pulmonary / critical care  9/19/2018  2:39 PM

## 2018-09-19 NOTE — PHYSICAL THERAPY NOTE
PHYSICAL THERAPY TREATMENT NOTE - INPATIENT    Room Number: 106/090-N     Session: 3  Number of Visits to Meet Established Goals: 5    Presenting Problem: anemia and management of tumor lysis    History related to current admission: Pt is [de-identified]year old male List  Active Problems:    NHL (non-Hodgkin's lymphoma) [facility])      Past Medical History  Past Medical History:  No date:  Atherosclerosis of coronary artery  07/2018: Brain bleed ([address]  No date: CANCER      Comment:  NHL mantle cell type  No date: Cataract Performed by Belen Yarbrough MD at [address]  No date: THYROIDECTOMY      Comment:  partial    SUBJECTIVE  \"I gotta get out of here\"     Patient’s self-stated goal is to walk more      OBJECTIVE  Precautions: Bed/chair and CGA with chair follow. Pt required 1 standing rest break . Pt returned to sit c CGA. Pt wheeled back to room. Pt performed seated therex for BLE. Pt left in chair, needs met. O2 sats monitored throughout.      Patient End of Session: Up in chair;Needs m

## 2018-09-19 NOTE — PLAN OF CARE
HEMATOLOGIC - ADULT    • Maintains hematologic stability Not Progressing          HEMATOLOGIC - ADULT    • Maintains hematologic stability Not Progressing          MUSCULOSKELETAL - ADULT    • Return mobility to safest level of function Progressing

## 2018-09-19 NOTE — PROGRESS NOTES
[facility]  Progress Note    Andra Marie Patient Status:  Inpatient    10/9/1935 MRN [id number]   [facility] 4NW-A Attending Jaspal Ivy MD   [facility] Day # 15 PCP Leanne Barrett MD     Subjective:    Andra Marie is a(n) [de-identified] y 2.29*   --    HGB  7.7*   --   6.6*  6.7*  7.6*   HCT  25.4*   --   21.5*  22.0*   --    MCV  101.6*   --   96.4  96.1   --    MCH  30.8   --   29.6  29.3   --    MCHC  30.3*   --   30.7*  30.5*   --    RDW  17.4*   --   17.0*  17.2*   --    NEPRELIM  38. 5 Plan:       1. Mantle cell NHL    On acalabrutinib - started for progressive disease (high WBC)  Treatment was on hold at admission for TLS   Resumed alacalbrutinib 100 bid   Higher WBC from alacalbrutinib as expected. This will eventually resolve.  Seems t

## 2018-09-19 NOTE — PROGRESS NOTES
[facility] Hospitalist Progress Note     Kael Nunn Patient Status:  Inpatient    10/9/1935 MRN [id number]   [facility] 4NW-A Attending Lianet Mary MD   [address] Day # 15 PCP Yovanny Mistry MD     CC: follow up    SUBJECTIVE:  Breathing --    --    MG  2.3  2.3   --   1.9   --    --    --    --    --    --    --    PHOS   --   4.8   --    --    --    --    --    --    --    --    --    GLU  139*  152*  196*  81   --    --   89 90   --    --    --     < > = values in this interval not dis given kayexalate X 2  - K improved-- had likely false reading earlier today with pseudohyperkalemia 2/2 severe leukocytosis.  K appeared in normal range with repeat checks  - Allopurinol      # REYNA- resolved  - initial  FENa 0.2%, consistent with pre-renal

## 2018-09-19 NOTE — PROGRESS NOTES
Sat out in chair. returned to bed for sleep. BIPAP on overnight , slept well. O2 3 liters high flow while awake. Port intact.

## 2018-09-19 NOTE — CM/SW NOTE
Interdisciplinary Rounds: 09/19/18  Admitted: 9/6/2018 LOS: 15  Disciplines in attendance: charge nurse, staff nurse, CM, [address] and discharge plan reviewed. Plan to d/c to [facility].     Active issues needing resolution prior to discharge: Hgb 6.5, 1 U

## 2018-09-20 NOTE — PLAN OF CARE
NURSING DISCHARGE NOTE    Discharged Rehab facility via Ambulance. Accompanied by Spouse and Support staff  Belongings Taken by patient/family.

## 2018-09-20 NOTE — CM/SW NOTE
SW spoke w/[facility] who stated the wife could ride in the ambulance w/the pt when he gets transported to [facility].

## 2018-09-20 NOTE — PROGRESS NOTES
Pulmonary Progress Note      NAME: Abi Muir - ROOM: 669/213-A - MRN: [id number] - Age: [de-identified]year old - : 10/9/1935    Assessment/Plan:  1.  Acute hypoxemic respiratory failure: 2/2 volume overload + Pneumonia vs pneumonitis.  He is improving, althoug Physical Exam:  /58 (BP Location: Left arm)   Pulse 53   Temp 97.7 °F (36.5 °C) (Oral)   Resp 18   Wt 150 lb 3.2 oz (68.1 kg)   SpO2 94%   BMI 22.84 kg/m²   Physical Exam:   General: alert, cooperative, no respiratory distress.    HEENT: Normoceph displayed. Imaging: I independently visualized all relevant chest imaging in PACS, agree with radiology interpretation except where noted.

## 2018-09-20 NOTE — PLAN OF CARE
A&Ox4. No complaints of pain. O2 4L NC, with continuous pulse ox sats in high 90s. Bipap at night. Tolerating diet, no N/V/D. Ambulatory with walker and assist. Worked with PT today. Report called to Cassandra Kirk at [facility].   AVS reviewed with patient an

## 2018-09-20 NOTE — OCCUPATIONAL THERAPY NOTE
OCCUPATIONAL THERAPY TREATMENT NOTE - INPATIENT     Room Number: 308/776-G  Session: 2   Number of Visits to Meet Established Goals: 5    Presenting Problem: NHL, anemia, tumor lysis     History related to current admission: Pt is [de-identified]year old male admitted Comment:  Performed by Nav Crespo MD at [facility] [facility] ENDOSCOPY  No date: CABG  2000: CABG, ARTERIAL, SINGLE  2000: CAROTID ENDARTERECTOMY      Comment:  right  No date: CATARACT      Comment:  od 10yrs ago  7/5/2018: [address]; Right      Comment: Modifier (G-Code): CK    FUNCTIONAL TRANSFER ASSESSMENT  Supine to Sit : Supervision  Sit to Stand: Supervision    Skilled Therapy Provided: Pt was found in bed in a semi-supine position. Pt performed supine>sit EOB with supervision assistance.  Pt performe activities of daily living, rest and sleep, work, leisure and social participation. Pt's raw score for the -PAC ADL assessment is 19 indicating that pt is a good HHOT candidate based on 42% of impairment.  Pt is motivated to continue therapy in order

## 2018-09-20 NOTE — PROGRESS NOTES
No change overnight. Walked in hallway. Bipap on overnight. Port intact.  Labs this am. Dr Dawit Skinner notified of critical white count, no new orders

## 2018-09-20 NOTE — PROGRESS NOTES
[facility]  Progress Note    Elsa Huerta Patient Status:  Inpatient    10/9/1935 MRN [id number]   [facility] 4NW-A Attending Yvette Mendoza MD   [address] Day [address] 14 PCP Jairon Camacho MD     Subjective:    Elsa Huerta is a(n) [de-identified] y --    --   2.91*   HGB  6.6*  6.7*  7.6*  8.9*  8.6*   HCT  21.5*  22.0*   --    --   27.2*   MCV  96.4  96.1   --    --   93.5   MCH  29.6  29.3   --    --   29.6   MCHC  30.7*  30.5*   --    --   31.6   RDW  17.0*  17.2*   --    --   17.2*   NEPRELIM Plan:       1.  Mantle cell NHL    On acalabrutinib - started for progressive disease (high WBC)  Treatment was on hold at admission for TLS   Resumed alacalbrutinib 100 bid   Leukocytosis is improving now (down to 128K)  Continue same dose of alacalbrutini

## 2018-09-20 NOTE — DISCHARGE SUMMARY
General Medicine Discharge Summary     Patient ID:  Rafaela Quinonez  [de-identified]year old  10/9/1935    Admit date: 9/6/2018    Discharge date and time: 9/20/2018  5:55 PM     Attending Physician: Gay att. providers recs  - acalabrutinib now resumed  - transfuse for hemoglobin <7, received PRBCs yesterday     # Tumor Lysis Syndrome - improved  - hyperkalemia 9/15                     - given kayexalate X 2  - K improved-- had likely false reading with pseudohyperkalemi Xr Chest Pa + Lat Chest (cpt=71046)    Result Date: 9/14/2018  PROCEDURE:  XR CHEST PA + LAT CHEST (CPT=71046)  INDICATIONS:  hypoxia  COMPARISON:  [facility] , XR CHEST AP PORTABLE  (CPT=71045), 9/13/2018, 5:30.   [facility] , XR CHEST AP PORTABLE  (CPT=7 the Dose Index Registry.   PATIENT STATED HISTORY: (As transcribed by Technologist)  Acute subdural hematoma    FINDINGS:  VENTRICLES/SULCI:  There is a stable mixed CT density right sided subdural hematoma adjacent to the right frontal lobe and parietal lo to the chest x-ray. There is no obstructive bowel gas pattern. There is air throughout the GI tract, to the rectum. There is mild to moderate apex right thoracolumbar scoliosis. Moderate to severe degenerative changes in the spine.   Moderate degenerati mild apical redistribution of the pulmonary vasculature. Small to moderate right pleural effusion is stable. Interstitial abnormalities the lungs are stable. No pneumothorax.       CONCLUSION:  No change since prior exam.     Dictated by: Kassy Murray CONCLUSION:  Significantly increased small to moderate right pleural effusion and patchy bilateral perihilar consolidations.      Dictated by: Miranda Amanda MD on 9/11/2018 at 7:15     Approved by: Miranda Amanda MD            Xr Chest Ap [place] Tab  Take 3 tablets (30 mg total) by mouth daily with breakfast for 3 days, THEN 2 tablets (20 mg total) daily with breakfast for 3 days. THEN decrease to 1.5 tablets (15 mg total) daily with breakfast. And continue 15mg daily until seen by pulmonologist.. Temp: 97.5 °F (36.4 °C)       Alert oriented. Total time for evaluation, record review, and coordinating care for discharge: approximately 35 minutes spent throughout day of discharge.      Fabio Wilkinson MD  [facility]ist

## 2018-09-20 NOTE — CM/SW NOTE
09/20/18 1400   Discharge disposition   Expected discharge disposition Short Term H   Name of [address]   Discharge transportation [employer]

## 2018-09-20 NOTE — PHYSICAL THERAPY NOTE
PHYSICAL THERAPY TREATMENT NOTE - INPATIENT    Room Number: 428/674-R     Session: 4  Number of Visits to Meet Established Goals: 5    Presenting Problem: anemia and management of tumor lysis    History related to current admission: Pt is [de-identified]year old male List  Active Problems:    NHL (non-Hodgkin's lymphoma) [facility])      Past Medical History  Past Medical History:  No date:  Atherosclerosis of coronary artery  07/2018: Brain bleed ([address]  No date: CANCER      Comment:  NHL mantle cell type  No date: Cataract Performed by Taco Ugarte MD at [address]  No date: THYROIDECTOMY      Comment:  partial    SUBJECTIVE  \"I want to go to the bathroom\"     Patient’s self-stated goal is to walk more      OBJECTIVE  Precautions: Hard participate. Supervision sup>sit. Supervision sit>stand to rollator. Pt amb bed to BR. Supervision toilet t/f . Pt demos fair + bal and is able to manage brief with supervision. Pt gait trained c rollator and CGA to supervision.  After seated rest, pt pa

## 2018-09-20 NOTE — CM/SW NOTE
Spoke with Haydee Kc from [place] [address] to inform her that pt is medically cleared for discharge today. Haydee Kc is working on bed. Patient will need to bring his own oral chemo medication. CM will inform patient of above.  / to remain available for

## 2018-12-20 NOTE — ED NOTES
Report to To Lopez RN at this time. Patient has returned from [address]. Resting on cart comfortably with son at bedside.

## 2018-12-20 NOTE — ED INITIAL ASSESSMENT (HPI)
Patient reports episode of pain on Sunday, starting from right temple area and working its way down through right eye. Reports sharp/shooting pain lasting 10-15 seconds. States he just closes his eyes and waits for it to pass. Denies any dizziness.

## 2018-12-20 NOTE — ED NOTES
Patient taken to CT department at this time by cart by PCT. Patient and son remain updated with plan of care.

## 2018-12-20 NOTE — ED PROVIDER NOTES
Patient Seen in: [facility] Emergency Department    History   Patient presents with: Eye Visual Problem (opthalmic)    Stated Complaint: eye pain    HPI    Patient is status post craniotomy July 2018 after subdural hematoma.   He is known to have CO BRONCHOSCOPY N/A 10/15/2014    Performed by James Lopez MD at [facility] ENDOSCOPY   • CABG     • CABG, ARTERIAL, SINGLE  2000   • CAROTID ENDARTERECTOMY  2000    right   • CATARACT      od 10yrs ago   • CRANIOTOMY SHANAE HOLES Right 7/5/2018    Performed by [address] [place] [address] Extraocular movements intact. Nose: Unremarkable without purulent nasal secretions or overlying sinus erythema. Throat: Posterior pharynx is normal.  Uvula midline nonswollen. Tongue is nonswollen.   Oromucosa is dry  Neck: Supple  Lungs: Clear to auscul PLATELET    Narrative: The following orders were created for panel order CBC WITH DIFFERENTIAL WITH PLATELET.   Procedure                               Abnormality         Status                     ---------                               ----------- an appointment as soon as possible for a visit          Medications Prescribed:  Current Discharge Medication List    START taking these medications    carBAMazepine (TEGRETOL) 200 MG Oral Tab  Take 1 tablet (200 mg total) by mouth 2 (two) times daily.   Qt

## 2019-01-01 ENCOUNTER — APPOINTMENT (OUTPATIENT)
Dept: GENERAL RADIOLOGY | Facility: HOSPITAL | Age: 84
DRG: 871 | End: 2019-01-01
Attending: NURSE PRACTITIONER
Payer: MEDICARE

## 2019-01-01 ENCOUNTER — APPOINTMENT (OUTPATIENT)
Dept: CT IMAGING | Facility: HOSPITAL | Age: 84
DRG: 871 | End: 2019-01-01
Attending: INTERNAL MEDICINE
Payer: MEDICARE

## 2019-01-01 ENCOUNTER — APPOINTMENT (OUTPATIENT)
Dept: CV DIAGNOSTICS | Facility: HOSPITAL | Age: 84
DRG: 871 | End: 2019-01-01
Attending: INTERNAL MEDICINE
Payer: MEDICARE

## 2019-01-01 ENCOUNTER — APPOINTMENT (OUTPATIENT)
Dept: GENERAL RADIOLOGY | Facility: HOSPITAL | Age: 84
DRG: 871 | End: 2019-01-01
Attending: INTERNAL MEDICINE
Payer: MEDICARE

## 2019-01-01 ENCOUNTER — APPOINTMENT (OUTPATIENT)
Dept: GENERAL RADIOLOGY | Facility: HOSPITAL | Age: 84
DRG: 871 | End: 2019-01-01
Attending: EMERGENCY MEDICINE
Payer: MEDICARE

## 2019-01-01 ENCOUNTER — APPOINTMENT (OUTPATIENT)
Dept: NUCLEAR MEDICINE | Facility: HOSPITAL | Age: 84
DRG: 871 | End: 2019-01-01
Attending: NURSE PRACTITIONER
Payer: MEDICARE

## 2019-01-01 ENCOUNTER — HOSPITAL ENCOUNTER (INPATIENT)
Facility: HOSPITAL | Age: 84
LOS: 3 days | DRG: 871 | End: 2019-01-01
Attending: EMERGENCY MEDICINE | Admitting: HOSPITALIST
Payer: MEDICARE

## 2019-01-01 VITALS
DIASTOLIC BLOOD PRESSURE: 40 MMHG | TEMPERATURE: 99 F | SYSTOLIC BLOOD PRESSURE: 98 MMHG | BODY MASS INDEX: 24.15 KG/M2 | OXYGEN SATURATION: 35 % | WEIGHT: 153.88 LBS | HEIGHT: 67 IN

## 2019-01-01 DIAGNOSIS — N17.9 ACUTE RENAL FAILURE, UNSPECIFIED ACUTE RENAL FAILURE TYPE (HCC): ICD-10-CM

## 2019-01-01 DIAGNOSIS — A41.9 SEPSIS DUE TO URINARY TRACT INFECTION (HCC): Primary | ICD-10-CM

## 2019-01-01 DIAGNOSIS — R41.0 DELIRIUM, ACUTE: ICD-10-CM

## 2019-01-01 DIAGNOSIS — D64.9 ANEMIA, UNSPECIFIED TYPE: ICD-10-CM

## 2019-01-01 DIAGNOSIS — E87.5 HYPERKALEMIA: ICD-10-CM

## 2019-01-01 DIAGNOSIS — D69.6 THROMBOCYTOPENIA (HCC): ICD-10-CM

## 2019-01-01 DIAGNOSIS — N39.0 SEPSIS DUE TO URINARY TRACT INFECTION (HCC): Primary | ICD-10-CM

## 2019-01-01 LAB
ADENOVIRUS PCR:: NEGATIVE
ALBUMIN SERPL-MCNC: 2.9 G/DL (ref 3.1–4.5)
ALBUMIN/GLOB SERPL: 0.7 {RATIO} (ref 1–2)
ALLENS TEST: POSITIVE
ALP LIVER SERPL-CCNC: 293 U/L (ref 45–117)
ALT SERPL-CCNC: 26 U/L (ref 17–63)
ANION GAP SERPL CALC-SCNC: 11 MMOL/L (ref 0–18)
ANION GAP SERPL CALC-SCNC: 12 MMOL/L (ref 0–18)
ANION GAP SERPL CALC-SCNC: 13 MMOL/L (ref 0–18)
ANION GAP SERPL CALC-SCNC: 8 MMOL/L (ref 0–18)
ANION GAP SERPL CALC-SCNC: 8 MMOL/L (ref 0–18)
ANION GAP SERPL CALC-SCNC: 9 MMOL/L (ref 0–18)
ANTIBODY SCREEN: NEGATIVE
APTT PPP: 28.8 SECONDS (ref 26.1–34.6)
APTT PPP: 37.7 SECONDS (ref 26.1–34.6)
ARTERIAL BLD GAS O2 SATURATION: 88 % (ref 92–100)
ARTERIAL BLD GAS O2 SATURATION: 90 % (ref 92–100)
ARTERIAL BLD GAS O2 SATURATION: 92 % (ref 92–100)
ARTERIAL BLOOD GAS BASE EXCESS: -6.5
ARTERIAL BLOOD GAS BASE EXCESS: -6.9
ARTERIAL BLOOD GAS BASE EXCESS: -7.2
ARTERIAL BLOOD GAS HCO3: 16.8 MEQ/L (ref 22–26)
ARTERIAL BLOOD GAS HCO3: 16.9 MEQ/L (ref 22–26)
ARTERIAL BLOOD GAS HCO3: 17 MEQ/L (ref 22–26)
ARTERIAL BLOOD GAS PCO2: 25 MM HG (ref 35–45)
ARTERIAL BLOOD GAS PCO2: 28 MM HG (ref 35–45)
ARTERIAL BLOOD GAS PCO2: 28 MM HG (ref 35–45)
ARTERIAL BLOOD GAS PH: 7.39 (ref 7.35–7.45)
ARTERIAL BLOOD GAS PH: 7.4 (ref 7.35–7.45)
ARTERIAL BLOOD GAS PH: 7.44 (ref 7.35–7.45)
ARTERIAL BLOOD GAS PO2: 49 MM HG (ref 80–105)
ARTERIAL BLOOD GAS PO2: 52 MM HG (ref 80–105)
ARTERIAL BLOOD GAS PO2: 56 MM HG (ref 80–105)
AST SERPL-CCNC: 107 U/L (ref 15–41)
ATRIAL RATE: 111 BPM
ATRIAL RATE: 35 BPM
B PERT DNA SPEC QL NAA+PROBE: NEGATIVE
BAND %: 1 %
BASOPHIL % MANUAL: 0 %
BASOPHIL ABSOLUTE MANUAL: 0 X10(3) UL (ref 0–0.1)
BILIRUB SERPL-MCNC: 0.8 MG/DL (ref 0.1–2)
BILIRUB UR QL STRIP.AUTO: NEGATIVE
BLOOD TYPE BARCODE: 6200
BUN BLD-MCNC: 30 MG/DL (ref 8–20)
BUN BLD-MCNC: 33 MG/DL (ref 8–20)
BUN BLD-MCNC: 33 MG/DL (ref 8–20)
BUN BLD-MCNC: 34 MG/DL (ref 8–20)
BUN BLD-MCNC: 34 MG/DL (ref 8–20)
BUN BLD-MCNC: 41 MG/DL (ref 8–20)
BUN/CREAT SERPL: 15.3 (ref 10–20)
BUN/CREAT SERPL: 15.7 (ref 10–20)
BUN/CREAT SERPL: 16.8 (ref 10–20)
BUN/CREAT SERPL: 17.3 (ref 10–20)
BUN/CREAT SERPL: 17.6 (ref 10–20)
BUN/CREAT SERPL: 19.2 (ref 10–20)
C PNEUM DNA SPEC QL NAA+PROBE: NEGATIVE
CALCIUM BLD-MCNC: 8 MG/DL (ref 8.3–10.3)
CALCIUM BLD-MCNC: 8.2 MG/DL (ref 8.3–10.3)
CALCIUM BLD-MCNC: 8.4 MG/DL (ref 8.3–10.3)
CALCIUM BLD-MCNC: 8.5 MG/DL (ref 8.3–10.3)
CALCIUM BLD-MCNC: 8.7 MG/DL (ref 8.3–10.3)
CALCIUM BLD-MCNC: 9.1 MG/DL (ref 8.3–10.3)
CALCULATED O2 SATURATION: 85 % (ref 92–100)
CALCULATED O2 SATURATION: 85 % (ref 92–100)
CALCULATED O2 SATURATION: 88 % (ref 92–100)
CARBOXYHEMOGLOBIN: 1 % SAT (ref 0–3)
CARBOXYHEMOGLOBIN: 1.1 % SAT (ref 0–3)
CARBOXYHEMOGLOBIN: 1.3 % SAT (ref 0–3)
CHLORIDE SERPL-SCNC: 108 MMOL/L (ref 101–111)
CHLORIDE SERPL-SCNC: 112 MMOL/L (ref 101–111)
CHLORIDE SERPL-SCNC: 115 MMOL/L (ref 101–111)
CHLORIDE SERPL-SCNC: 115 MMOL/L (ref 101–111)
CHLORIDE SERPL-SCNC: 117 MMOL/L (ref 101–111)
CHLORIDE SERPL-SCNC: 119 MMOL/L (ref 101–111)
CO2 SERPL-SCNC: 17 MMOL/L (ref 22–32)
CO2 SERPL-SCNC: 18 MMOL/L (ref 22–32)
CO2 SERPL-SCNC: 19 MMOL/L (ref 22–32)
CO2 SERPL-SCNC: 19 MMOL/L (ref 22–32)
CO2 SERPL-SCNC: 20 MMOL/L (ref 22–32)
CO2 SERPL-SCNC: 20 MMOL/L (ref 22–32)
COLOR UR AUTO: YELLOW
CORONAVIRUS 229E PCR:: NEGATIVE
CORONAVIRUS HKU1 PCR:: NEGATIVE
CORONAVIRUS NL63 PCR:: NEGATIVE
CORONAVIRUS OC43 PCR:: NEGATIVE
CREAT BLD-MCNC: 1.73 MG/DL (ref 0.7–1.3)
CREAT BLD-MCNC: 1.77 MG/DL (ref 0.7–1.3)
CREAT BLD-MCNC: 1.97 MG/DL (ref 0.7–1.3)
CREAT BLD-MCNC: 2.15 MG/DL (ref 0.7–1.3)
CREAT BLD-MCNC: 2.16 MG/DL (ref 0.7–1.3)
CREAT BLD-MCNC: 2.33 MG/DL (ref 0.7–1.3)
CREAT UR-SCNC: 124 MG/DL
EOSINOPHIL % MANUAL: 0 %
EOSINOPHIL ABSOLUTE MANUAL: 0 X10(3) UL (ref 0–0.3)
ERYTHROCYTE [DISTWIDTH] IN BLOOD BY AUTOMATED COUNT: 15 % (ref 11.5–16)
ERYTHROCYTE [DISTWIDTH] IN BLOOD BY AUTOMATED COUNT: 15.4 % (ref 11.5–16)
ERYTHROCYTE [DISTWIDTH] IN BLOOD BY AUTOMATED COUNT: 17.2 % (ref 11.5–16)
ERYTHROCYTE [DISTWIDTH] IN BLOOD BY AUTOMATED COUNT: 17.5 % (ref 11.5–16)
EXPIRATORY PRESSURE: 8 CM H2O
FIO2: 60 %
FLUAV RNA SPEC QL NAA+PROBE: NEGATIVE
FLUBV RNA SPEC QL NAA+PROBE: NEGATIVE
GLOBULIN PLAS-MCNC: 3.9 G/DL (ref 2.8–4.4)
GLUCOSE BLD-MCNC: 100 MG/DL (ref 70–99)
GLUCOSE BLD-MCNC: 103 MG/DL (ref 65–99)
GLUCOSE BLD-MCNC: 106 MG/DL (ref 65–99)
GLUCOSE BLD-MCNC: 108 MG/DL (ref 65–99)
GLUCOSE BLD-MCNC: 118 MG/DL (ref 65–99)
GLUCOSE BLD-MCNC: 122 MG/DL (ref 65–99)
GLUCOSE BLD-MCNC: 125 MG/DL (ref 65–99)
GLUCOSE BLD-MCNC: 128 MG/DL (ref 65–99)
GLUCOSE BLD-MCNC: 130 MG/DL (ref 70–99)
GLUCOSE BLD-MCNC: 134 MG/DL (ref 70–99)
GLUCOSE BLD-MCNC: 141 MG/DL (ref 65–99)
GLUCOSE BLD-MCNC: 166 MG/DL (ref 65–99)
GLUCOSE BLD-MCNC: 210 MG/DL (ref 65–99)
GLUCOSE BLD-MCNC: 83 MG/DL (ref 65–99)
GLUCOSE BLD-MCNC: 83 MG/DL (ref 70–99)
GLUCOSE BLD-MCNC: 83 MG/DL (ref 70–99)
GLUCOSE BLD-MCNC: 88 MG/DL (ref 70–99)
GLUCOSE BLD-MCNC: 94 MG/DL (ref 65–99)
GLUCOSE UR STRIP.AUTO-MCNC: NEGATIVE MG/DL
HAV IGM SER QL: 1.6 MG/DL (ref 1.8–2.5)
HCT VFR BLD AUTO: 19.5 % (ref 37–53)
HCT VFR BLD AUTO: 22.3 % (ref 37–53)
HCT VFR BLD AUTO: 22.4 % (ref 37–53)
HCT VFR BLD AUTO: 22.5 % (ref 37–53)
HGB BLD-MCNC: 6.3 G/DL (ref 13–17)
HGB BLD-MCNC: 7.2 G/DL (ref 13–17)
HGB BLD-MCNC: 7.8 G/DL (ref 13–17)
HYALINE CASTS #/AREA URNS AUTO: PRESENT /LPF
INR BLD: 1.13 (ref 0.9–1.1)
INR BLD: 1.37 (ref 0.9–1.1)
INSP PRESSURE: 15 CM H2O
IONIZED CALCIUM: 1.25 MMOL/L (ref 1.12–1.32)
IONIZED CALCIUM: 1.32 MMOL/L (ref 1.12–1.32)
L/M: 15 L/MIN
L/M: 5 L/MIN
LACTIC ACID ARTERIAL: 2.7 MMOL/L (ref 0.5–2)
LACTIC ACID ARTERIAL: 3 MMOL/L (ref 0.5–2)
LACTIC ACID: 1.8 MMOL/L (ref 0.5–2)
LACTIC ACID: 1.9 MMOL/L (ref 0.5–2)
LACTIC ACID: 2.2 MMOL/L (ref 0.5–2)
LACTIC ACID: 2.4 MMOL/L (ref 0.5–2)
LACTIC ACID: 3.4 MMOL/L (ref 0.5–2)
LACTIC ACID: 3.5 MMOL/L (ref 0.5–2)
LACTIC ACID: 3.9 MMOL/L (ref 0.5–2)
LEUKOCYTE ESTERASE UR QL STRIP.AUTO: NEGATIVE
LYMPHOCYTE % MANUAL: 93 %
LYMPHOCYTE % MANUAL: 95 %
LYMPHOCYTE % MANUAL: 95 %
LYMPHOCYTE % MANUAL: 97 %
LYMPHOCYTE ABSOLUTE MANUAL: 100.01 X10(3) UL (ref 0.9–4)
LYMPHOCYTE ABSOLUTE MANUAL: 125.97 X10(3) UL (ref 0.9–4)
LYMPHOCYTE ABSOLUTE MANUAL: 62.61 X10(3) UL (ref 0.9–4)
LYMPHOCYTE ABSOLUTE MANUAL: 73.47 X10(3) UL (ref 0.9–4)
M PROTEIN MFR SERPL ELPH: 6.8 G/DL (ref 6.4–8.2)
MCH RBC QN AUTO: 31.9 PG (ref 27–33.2)
MCH RBC QN AUTO: 32.6 PG (ref 27–33.2)
MCH RBC QN AUTO: 32.9 PG (ref 27–33.2)
MCH RBC QN AUTO: 33.5 PG (ref 27–33.2)
MCHC RBC AUTO-ENTMCNC: 32 G/DL (ref 31–37)
MCHC RBC AUTO-ENTMCNC: 32.1 G/DL (ref 31–37)
MCHC RBC AUTO-ENTMCNC: 32.3 G/DL (ref 31–37)
MCHC RBC AUTO-ENTMCNC: 32.3 G/DL (ref 31–37)
MCV RBC AUTO: 100.9 FL (ref 80–99)
MCV RBC AUTO: 102.7 FL (ref 80–99)
MCV RBC AUTO: 103.7 FL (ref 80–99)
MCV RBC AUTO: 99.1 FL (ref 80–99)
METAPNEUMOVIRUS PCR:: NEGATIVE
METHEMOGLOBIN: 0.5 % SAT (ref 0.4–1.5)
MONOCYTE % MANUAL: 2 %
MONOCYTE % MANUAL: 2 %
MONOCYTE % MANUAL: 3 %
MONOCYTE % MANUAL: 6 %
MONOCYTE ABSOLUTE MANUAL: 1.32 X10(3) UL (ref 0.1–1)
MONOCYTE ABSOLUTE MANUAL: 2.06 X10(3) UL (ref 0.1–1)
MONOCYTE ABSOLUTE MANUAL: 3.98 X10(3) UL (ref 0.1–1)
MONOCYTE ABSOLUTE MANUAL: 4.74 X10(3) UL (ref 0.1–1)
MYCOPLASMA PNEUMONIA PCR:: NEGATIVE
NEUTROPHIL ABS PRELIM: 2.95 X10 (3) UL (ref 1.3–6.7)
NEUTROPHIL ABS PRELIM: 3.21 X10 (3) UL (ref 1.3–6.7)
NEUTROPHIL ABS PRELIM: 5.86 X10 (3) UL (ref 1.3–6.7)
NEUTROPHIL ABS PRELIM: 6.09 X10 (3) UL (ref 1.3–6.7)
NEUTROPHIL ABSOLUTE MANUAL: 0.79 X10(3) UL (ref 1.3–6.7)
NEUTROPHIL ABSOLUTE MANUAL: 1.03 X10(3) UL (ref 1.3–6.7)
NEUTROPHIL ABSOLUTE MANUAL: 1.98 X10(3) UL (ref 1.3–6.7)
NEUTROPHIL ABSOLUTE MANUAL: 2.65 X10(3) UL (ref 1.3–6.7)
NEUTROPHILS % MANUAL: 1 %
NEUTROPHILS % MANUAL: 3 %
NITRITE UR QL STRIP.AUTO: NEGATIVE
OSMOLALITY SERPL CALC.SUM OF ELEC: 287 MOSM/KG (ref 275–295)
OSMOLALITY SERPL CALC.SUM OF ELEC: 304 MOSM/KG (ref 275–295)
OSMOLALITY SERPL CALC.SUM OF ELEC: 304 MOSM/KG (ref 275–295)
OSMOLALITY SERPL CALC.SUM OF ELEC: 305 MOSM/KG (ref 275–295)
OSMOLALITY SERPL CALC.SUM OF ELEC: 306 MOSM/KG (ref 275–295)
OSMOLALITY SERPL CALC.SUM OF ELEC: 322 MOSM/KG (ref 275–295)
P AXIS: 33 DEGREES
P-R INTERVAL: 152 MS
PARAINFLUENZA 1 PCR:: NEGATIVE
PARAINFLUENZA 2 PCR:: NEGATIVE
PARAINFLUENZA 3 PCR:: NEGATIVE
PARAINFLUENZA 4 PCR:: NEGATIVE
PATIENT TEMPERATURE: 98.3 F
PATIENT TEMPERATURE: 98.6 F
PATIENT TEMPERATURE: 99 F
PH UR STRIP.AUTO: 5 [PH] (ref 4.5–8)
PLATELET # BLD AUTO: 20 10(3)UL (ref 150–450)
PLATELET # BLD AUTO: 26 10(3)UL (ref 150–450)
PLATELET # BLD AUTO: 55 10(3)UL (ref 150–450)
PLATELET # BLD AUTO: 55 10(3)UL (ref 150–450)
PLATELET MORPHOLOGY: NORMAL
POTASSIUM BLOOD GAS: 3.1 MMOL/L (ref 3.6–5.1)
POTASSIUM BLOOD GAS: 3.6 MMOL/L (ref 3.6–5.1)
POTASSIUM SERPL-SCNC: 3.7 MMOL/L (ref 3.6–5.1)
POTASSIUM SERPL-SCNC: 3.8 MMOL/L (ref 3.6–5.1)
POTASSIUM SERPL-SCNC: 3.9 MMOL/L (ref 3.6–5.1)
POTASSIUM SERPL-SCNC: 4.5 MMOL/L (ref 3.6–5.1)
POTASSIUM SERPL-SCNC: 4.7 MMOL/L (ref 3.6–5.1)
POTASSIUM SERPL-SCNC: 7.7 MMOL/L (ref 3.6–5.1)
PRO-BETA NATRIURETIC PEPTIDE: 5470 PG/ML (ref ?–450)
PROCALCITONIN SERPL-MCNC: 2.33 NG/ML
PROT UR STRIP.AUTO-MCNC: 30 MG/DL
PSA SERPL DL<=0.01 NG/ML-MCNC: 15 SECONDS (ref 12.4–14.7)
PSA SERPL DL<=0.01 NG/ML-MCNC: 17.4 SECONDS (ref 12.4–14.7)
Q-T INTERVAL: 368 MS
Q-T INTERVAL: 386 MS
QRS DURATION: 140 MS
QRS DURATION: 142 MS
QTC CALCULATION (BEZET): 500 MS
QTC CALCULATION (BEZET): 565 MS
R AXIS: 64 DEGREES
R AXIS: 75 DEGREES
RBC # BLD AUTO: 1.88 X10(6)UL (ref 3.8–5.8)
RBC # BLD AUTO: 2.19 X10(6)UL (ref 3.8–5.8)
RBC # BLD AUTO: 2.21 X10(6)UL (ref 3.8–5.8)
RBC # BLD AUTO: 2.26 X10(6)UL (ref 3.8–5.8)
RED CELL DISTRIBUTION WIDTH-SD: 56.7 FL (ref 35.1–46.3)
RED CELL DISTRIBUTION WIDTH-SD: 57.7 FL (ref 35.1–46.3)
RED CELL DISTRIBUTION WIDTH-SD: 63.2 FL (ref 35.1–46.3)
RED CELL DISTRIBUTION WIDTH-SD: 63.3 FL (ref 35.1–46.3)
RH BLOOD TYPE: POSITIVE
RHINOVIRUS/ENTERO PCR:: NEGATIVE
RSV RNA SPEC QL NAA+PROBE: NEGATIVE
SODIUM BLOOD GAS: 145 MMOL/L (ref 136–144)
SODIUM BLOOD GAS: 147 MMOL/L (ref 136–144)
SODIUM SERPL-SCNC: 135 MMOL/L (ref 136–144)
SODIUM SERPL-SCNC: 142 MMOL/L (ref 136–144)
SODIUM SERPL-SCNC: 143 MMOL/L (ref 136–144)
SODIUM SERPL-SCNC: 145 MMOL/L (ref 136–144)
SODIUM SERPL-SCNC: 145 MMOL/L (ref 136–144)
SODIUM SERPL-SCNC: 150 MMOL/L (ref 136–144)
SODIUM SERPL-SCNC: 37 MMOL/L
SP GR UR STRIP.AUTO: 1.02 (ref 1–1.03)
T AXIS: -2 DEGREES
T AXIS: 11 DEGREES
TOTAL CELLS COUNTED: 100
TOTAL HEMOGLOBIN: 7.4 G/DL (ref 12.6–17.4)
TOTAL HEMOGLOBIN: 7.4 G/DL (ref 12.6–17.4)
TOTAL HEMOGLOBIN: 7.6 G/DL (ref 12.6–17.4)
UROBILINOGEN UR STRIP.AUTO-MCNC: 2 MG/DL
UUN UR-MCNC: 671 MG/DL
VENTRICULAR RATE: 111 BPM
VENTRICULAR RATE: 129 BPM
WBC # BLD AUTO: 103.1 X10(3) UL (ref 4–13)
WBC # BLD AUTO: 132.6 X10(3) UL (ref 4–13)
WBC # BLD AUTO: 65.9 X10(3) UL (ref 4–13)
WBC # BLD AUTO: 79 X10(3) UL (ref 4–13)
WBC CLUMPS UR QL AUTO: PRESENT

## 2019-01-01 PROCEDURE — 30233N1 TRANSFUSION OF NONAUTOLOGOUS RED BLOOD CELLS INTO PERIPHERAL VEIN, PERCUTANEOUS APPROACH: ICD-10-PCS | Performed by: INTERNAL MEDICINE

## 2019-01-01 PROCEDURE — 71045 X-RAY EXAM CHEST 1 VIEW: CPT | Performed by: NURSE PRACTITIONER

## 2019-01-01 PROCEDURE — 5A09457 ASSISTANCE WITH RESPIRATORY VENTILATION, 24-96 CONSECUTIVE HOURS, CONTINUOUS POSITIVE AIRWAY PRESSURE: ICD-10-PCS | Performed by: INTERNAL MEDICINE

## 2019-01-01 PROCEDURE — 93306 TTE W/DOPPLER COMPLETE: CPT | Performed by: INTERNAL MEDICINE

## 2019-01-01 PROCEDURE — 99223 1ST HOSP IP/OBS HIGH 75: CPT | Performed by: INTERNAL MEDICINE

## 2019-01-01 PROCEDURE — 99291 CRITICAL CARE FIRST HOUR: CPT | Performed by: NURSE PRACTITIONER

## 2019-01-01 PROCEDURE — 78582 LUNG VENTILAT&PERFUS IMAGING: CPT | Performed by: NURSE PRACTITIONER

## 2019-01-01 PROCEDURE — 71045 X-RAY EXAM CHEST 1 VIEW: CPT | Performed by: EMERGENCY MEDICINE

## 2019-01-01 PROCEDURE — 74176 CT ABD & PELVIS W/O CONTRAST: CPT | Performed by: INTERNAL MEDICINE

## 2019-01-01 PROCEDURE — 70450 CT HEAD/BRAIN W/O DYE: CPT | Performed by: INTERNAL MEDICINE

## 2019-01-01 RX ORDER — DEXTROSE MONOHYDRATE 50 MG/ML
500 INJECTION, SOLUTION INTRAVENOUS ONCE
Status: COMPLETED | OUTPATIENT
Start: 2019-01-01 | End: 2019-01-01

## 2019-01-01 RX ORDER — GABAPENTIN 300 MG/1
600 CAPSULE ORAL 2 TIMES DAILY
Status: DISCONTINUED | OUTPATIENT
Start: 2019-01-01 | End: 2019-01-01

## 2019-01-01 RX ORDER — CODEINE PHOSPHATE AND GUAIFENESIN 10; 100 MG/5ML; MG/5ML
5 SOLUTION ORAL EVERY 4 HOURS PRN
Status: DISCONTINUED | OUTPATIENT
Start: 2019-01-01 | End: 2019-01-01

## 2019-01-01 RX ORDER — MORPHINE SULFATE 4 MG/ML
2 INJECTION, SOLUTION INTRAMUSCULAR; INTRAVENOUS EVERY 2 HOUR PRN
Status: DISCONTINUED | OUTPATIENT
Start: 2019-01-01 | End: 2019-01-01

## 2019-01-01 RX ORDER — HALOPERIDOL 5 MG/ML
2 INJECTION INTRAMUSCULAR EVERY 6 HOURS PRN
Status: DISCONTINUED | OUTPATIENT
Start: 2019-01-01 | End: 2019-01-24

## 2019-01-01 RX ORDER — IPRATROPIUM BROMIDE AND ALBUTEROL SULFATE 2.5; .5 MG/3ML; MG/3ML
3 SOLUTION RESPIRATORY (INHALATION) EVERY 4 HOURS PRN
Status: DISCONTINUED | OUTPATIENT
Start: 2019-01-01 | End: 2019-01-24

## 2019-01-01 RX ORDER — FUROSEMIDE 10 MG/ML
20 INJECTION INTRAMUSCULAR; INTRAVENOUS EVERY 6 HOURS PRN
Status: DISCONTINUED | OUTPATIENT
Start: 2019-01-01 | End: 2019-01-24

## 2019-01-01 RX ORDER — AMITRIPTYLINE HYDROCHLORIDE 50 MG/1
100 TABLET, FILM COATED ORAL NIGHTLY
COMMUNITY

## 2019-01-01 RX ORDER — ACETAMINOPHEN 650 MG/1
1300 SUPPOSITORY RECTAL ONCE
Status: COMPLETED | OUTPATIENT
Start: 2019-01-01 | End: 2019-01-01

## 2019-01-01 RX ORDER — OSELTAMIVIR PHOSPHATE 30 MG/1
30 CAPSULE ORAL EVERY EVENING
Status: DISCONTINUED | OUTPATIENT
Start: 2019-01-01 | End: 2019-01-01

## 2019-01-01 RX ORDER — FUROSEMIDE 10 MG/ML
80 INJECTION INTRAMUSCULAR; INTRAVENOUS ONCE
Status: COMPLETED | OUTPATIENT
Start: 2019-01-01 | End: 2019-01-01

## 2019-01-01 RX ORDER — AMITRIPTYLINE HYDROCHLORIDE 25 MG/1
50 TABLET, FILM COATED ORAL EVERY MORNING
Status: DISCONTINUED | OUTPATIENT
Start: 2019-01-01 | End: 2019-01-01

## 2019-01-01 RX ORDER — DEXMEDETOMIDINE HYDROCHLORIDE 4 UG/ML
INJECTION, SOLUTION INTRAVENOUS CONTINUOUS PRN
Status: DISCONTINUED | OUTPATIENT
Start: 2019-01-01 | End: 2019-01-01

## 2019-01-01 RX ORDER — PHENYTOIN SODIUM 100 MG/1
200 CAPSULE, EXTENDED RELEASE ORAL EVERY MORNING
COMMUNITY

## 2019-01-01 RX ORDER — ATORVASTATIN CALCIUM 40 MG/1
40 TABLET, FILM COATED ORAL NIGHTLY
Status: DISCONTINUED | OUTPATIENT
Start: 2019-01-01 | End: 2019-01-01

## 2019-01-01 RX ORDER — SODIUM CHLORIDE 9 MG/ML
INJECTION, SOLUTION INTRAVENOUS CONTINUOUS
Status: DISCONTINUED | OUTPATIENT
Start: 2019-01-01 | End: 2019-01-01

## 2019-01-01 RX ORDER — LORAZEPAM 2 MG/ML
1 INJECTION INTRAMUSCULAR EVERY 30 MIN PRN
Status: DISCONTINUED | OUTPATIENT
Start: 2019-01-01 | End: 2019-01-24

## 2019-01-01 RX ORDER — GLYCOPYRROLATE 0.2 MG/ML
0.2 INJECTION, SOLUTION INTRAMUSCULAR; INTRAVENOUS EVERY 6 HOURS PRN
Status: DISCONTINUED | OUTPATIENT
Start: 2019-01-01 | End: 2019-01-24

## 2019-01-01 RX ORDER — SODIUM CHLORIDE 450 MG/100ML
INJECTION, SOLUTION INTRAVENOUS CONTINUOUS
Status: DISCONTINUED | OUTPATIENT
Start: 2019-01-01 | End: 2019-01-01

## 2019-01-01 RX ORDER — PHENYTOIN SODIUM 50 MG/ML
250 INJECTION, SOLUTION INTRAMUSCULAR; INTRAVENOUS EVERY 12 HOURS SCHEDULED
Status: DISCONTINUED | OUTPATIENT
Start: 2019-01-01 | End: 2019-01-01

## 2019-01-01 RX ORDER — ACETAMINOPHEN 650 MG/1
650 SUPPOSITORY RECTAL EVERY 6 HOURS PRN
Status: DISCONTINUED | OUTPATIENT
Start: 2019-01-01 | End: 2019-01-01

## 2019-01-01 RX ORDER — SODIUM CHLORIDE 9 MG/ML
INJECTION, SOLUTION INTRAVENOUS ONCE
Status: COMPLETED | OUTPATIENT
Start: 2019-01-01 | End: 2019-01-01

## 2019-01-01 RX ORDER — ACETAMINOPHEN 650 MG/1
650 SUPPOSITORY RECTAL EVERY 6 HOURS PRN
Status: DISCONTINUED | OUTPATIENT
Start: 2019-01-01 | End: 2019-01-24

## 2019-01-01 RX ORDER — FUROSEMIDE 10 MG/ML
60 INJECTION INTRAMUSCULAR; INTRAVENOUS ONCE
Status: COMPLETED | OUTPATIENT
Start: 2019-01-01 | End: 2019-01-01

## 2019-01-01 RX ORDER — IPRATROPIUM BROMIDE AND ALBUTEROL SULFATE 2.5; .5 MG/3ML; MG/3ML
3 SOLUTION RESPIRATORY (INHALATION)
Status: DISCONTINUED | OUTPATIENT
Start: 2019-01-01 | End: 2019-01-01

## 2019-01-01 RX ORDER — DEXTROSE MONOHYDRATE 25 G/50ML
50 INJECTION, SOLUTION INTRAVENOUS
Status: DISCONTINUED | OUTPATIENT
Start: 2019-01-01 | End: 2019-01-01

## 2019-01-01 RX ORDER — DEXTROSE MONOHYDRATE 25 G/50ML
INJECTION, SOLUTION INTRAVENOUS
Status: DISPENSED
Start: 2019-01-01 | End: 2019-01-01

## 2019-01-01 RX ORDER — ACETAMINOPHEN 325 MG/1
650 TABLET ORAL EVERY 6 HOURS PRN
Status: DISCONTINUED | OUTPATIENT
Start: 2019-01-01 | End: 2019-01-01

## 2019-01-01 RX ORDER — MORPHINE SULFATE 4 MG/ML
2 INJECTION, SOLUTION INTRAMUSCULAR; INTRAVENOUS
Status: DISCONTINUED | OUTPATIENT
Start: 2019-01-01 | End: 2019-01-24

## 2019-01-01 RX ORDER — ALLOPURINOL 300 MG/1
300 TABLET ORAL DAILY
Status: DISCONTINUED | OUTPATIENT
Start: 2019-01-01 | End: 2019-01-01

## 2019-01-01 RX ORDER — HALOPERIDOL 5 MG/ML
1 INJECTION INTRAMUSCULAR EVERY 6 HOURS PRN
Status: DISCONTINUED | OUTPATIENT
Start: 2019-01-01 | End: 2019-01-01

## 2019-01-01 RX ORDER — AMITRIPTYLINE HYDROCHLORIDE 25 MG/1
100 TABLET, FILM COATED ORAL NIGHTLY
Status: DISCONTINUED | OUTPATIENT
Start: 2019-01-01 | End: 2019-01-01

## 2019-01-01 RX ORDER — PHENYTOIN SODIUM 100 MG/1
200 CAPSULE, EXTENDED RELEASE ORAL EVERY MORNING
Status: DISCONTINUED | OUTPATIENT
Start: 2019-01-01 | End: 2019-01-01

## 2019-01-01 RX ORDER — PHENYTOIN SODIUM 100 MG/1
300 CAPSULE, EXTENDED RELEASE ORAL EVERY EVENING
Status: DISCONTINUED | OUTPATIENT
Start: 2019-01-01 | End: 2019-01-01

## 2019-01-01 RX ORDER — LEVOFLOXACIN 5 MG/ML
750 INJECTION, SOLUTION INTRAVENOUS
Status: DISCONTINUED | OUTPATIENT
Start: 2019-01-01 | End: 2019-01-01

## 2019-01-01 RX ORDER — GABAPENTIN 400 MG/1
800 CAPSULE ORAL 4 TIMES DAILY
Status: DISCONTINUED | OUTPATIENT
Start: 2019-01-01 | End: 2019-01-01

## 2019-01-01 RX ORDER — CARBAMAZEPINE 200 MG/1
200 TABLET ORAL 2 TIMES DAILY
Status: DISCONTINUED | OUTPATIENT
Start: 2019-01-01 | End: 2019-01-01

## 2019-01-01 RX ORDER — PHENYTOIN SODIUM 100 MG/1
300 CAPSULE, EXTENDED RELEASE ORAL EVERY EVENING
COMMUNITY

## 2019-01-01 RX ORDER — DEXTROSE MONOHYDRATE 25 G/50ML
50 INJECTION, SOLUTION INTRAVENOUS ONCE
Status: COMPLETED | OUTPATIENT
Start: 2019-01-01 | End: 2019-01-01

## 2019-01-01 RX ORDER — OSELTAMIVIR PHOSPHATE 75 MG/1
75 CAPSULE ORAL DAILY
Status: DISCONTINUED | OUTPATIENT
Start: 2019-01-01 | End: 2019-01-01 | Stop reason: DRUGHIGH

## 2019-01-18 PROBLEM — J90 PLEURAL EFFUSION: Status: ACTIVE | Noted: 2019-01-01

## 2019-01-20 PROBLEM — D64.9 ANEMIA, UNSPECIFIED TYPE: Status: ACTIVE | Noted: 2019-01-01

## 2019-01-20 PROBLEM — E87.1 HYPONATREMIA: Status: ACTIVE | Noted: 2019-01-01

## 2019-01-20 PROBLEM — D72.829 LEUKOCYTOSIS: Status: ACTIVE | Noted: 2019-01-01

## 2019-01-20 PROBLEM — N39.0 SEPSIS DUE TO URINARY TRACT INFECTION (HCC): Status: ACTIVE | Noted: 2019-01-01

## 2019-01-20 PROBLEM — A41.9 SEPSIS DUE TO URINARY TRACT INFECTION (HCC): Status: ACTIVE | Noted: 2019-01-01

## 2019-01-20 PROBLEM — E87.5 HYPERKALEMIA: Status: ACTIVE | Noted: 2019-01-01

## 2019-01-20 PROBLEM — N17.9 ACUTE RENAL FAILURE, UNSPECIFIED ACUTE RENAL FAILURE TYPE (HCC): Status: ACTIVE | Noted: 2019-01-01

## 2019-01-20 PROBLEM — E87.2 METABOLIC ACIDOSIS: Status: ACTIVE | Noted: 2019-01-01

## 2019-01-20 PROBLEM — R41.0 DELIRIUM, ACUTE: Status: ACTIVE | Noted: 2019-01-01

## 2019-01-20 NOTE — ED INITIAL ASSESSMENT (HPI)
Pt here ems after being called for a pt generalized weakness and fever. Pt recently was d/c home from hospital with pneumonia on Thursday. Pt sitting up sleeping on stretcher family en route.

## 2019-01-20 NOTE — H&P
[facility] hospitalist H+P  PCP Elaine Cote MD  CC fever, altered mental status  HPI 81 yo male with multiple medical problems including but not limited to NHL, COPD, DM2, HTN today with altered mental status, tachycardic, febrile. Diagnosed with sepsis. K 7. RIGHT PHACOEMULSIFICATION OF CATARACT WITH INTRAOCULAR LENS IMPLANT 17732 Right 10/5/2009    Performed by Mariah Dunn MD at [address] [place] [address]   • THYROIDECTOMY      partial     Family History   Problem Relation Age of Onset   • Psychiatric 3      Exercise: bowl, bikes      Employment: retired      Caffeine intake: pot coffee/d, 4 diet soda/d                All: No Known Allergies  Meds; No current facility-administered medications on file prior to encounter.    Current Outpatient Medication constipation. Disp:  Rfl:    Cholecalciferol (VITAMIN D-3) 5000 units Oral Tab Take 5,000 Units by mouth daily.    Disp:  Rfl:      ROS 10 systems reviewed and negative except as in HPI  PE    01/20/19  1318   BP:    Pulse:    Resp:    Temp: (!) 101.6 °F (3 wheezing    HTN monitor BP    Pt is critically ill    Another hospitalist will see on 1/21/19    Manuel Olsen MD  [facility] hospitalist  [phone]

## 2019-01-20 NOTE — ED PROVIDER NOTES
Patient Seen in: [facility] 4sw-a    History   Patient presents with:  Fatigue (constitutional, neurologic)    Stated Complaint: fatigue weakness    HPI    Patient presents with fatigue the and altered mental status.   The patient's son gives the his TEMPORAL PARIETAL SHANAE HOLES POSSIBLE CRANIOTOMY   • BRONCHOSCOPY N/A 10/15/2014    Performed by Layla Damon MD at [facility] ENDOSCOPY   • CABG     • CABG, ARTERIAL, SINGLE  2000   • CAROTID ENDARTERECTOMY  2000    right   • CATARACT      od 10yrs ago   • CRA to light, oropharynx clear, mucous membranes very dry. Neck: Supple. Cardiovascular: Tachycardic and regular. Respiratory: Lungs clear to auscultation. Abdomen: Soft, nontender, no rebound or guarding, normal active bowel sounds, no CVA tenderness.   Ex CO2 18.0 (*)     BUN 33 (*)     Creatinine 1.97 (*)     Calculated Osmolality 306 (*)     GFR, Non- 31 (*)     GFR, -American 36 (*)     All other components within normal limits   LACTIC ACID 3 HR POST POSITIVE - Abnormal; Notable f 1/20/2019  PROCEDURE:  XR CHEST AP PORTABLE  (CPT=71045)  TECHNIQUE:  AP chest radiograph was obtained. COMPARISON:  [facility] XR CHEST AP PORTABLE  (CPT=71045), 9/17/2018, 15:03.   INDICATIONS:  fatigue weakness  PATIENT STATED HISTORY: (As transcribed by Intravenous Given 1/20/19 1237)   sodium chloride 0.9% IV bolus 1,000 mL (0 mL Intravenous Stopped 1/20/19 1426)     The patient was started on aggressive fluid resuscitation and given rectal Tylenol.   His urine does show evidence of infection and the ignacia A total of 35 minutes of critical care time (exclusive of billable procedures) was administered to manage the patient's critical lab values due to his hyperkalemia and sepsis.   This involved direct patient intervention, complex decision making, and/or ex

## 2019-01-20 NOTE — PLAN OF CARE
Received pt from ER nurse on 4 L nasal cannula saturating well. Pt alert and oriented x4, denies pain at this time but states legs feel heavy. Bedside dysphagia screening completed, tolerated clear liquids. VSS, will continue to monitor.  Family and pt upda

## 2019-01-20 NOTE — CONSULTS
[facility]    Report of Consultation    Connie San Patient Status:  Inpatient    10/9/1935 MRN [id number]   [facility] 4SW-A Attending Yanique Grimm MD   Hosp Day # 0 PCP Mitzy Covarrubias MD       REASON FOR CONSULT:     REYNA, hype • CABG     • CABG, ARTERIAL, SINGLE  2000   • CAROTID ENDARTERECTOMY  2000    right   • CATARACT      od 10yrs ago   • CRANIOTOMY SHANAE HOLES Right 7/5/2018    Performed by Kelly Orozco MD at [facility] MAIN OR   • LAPAROSCOPY, SURGICAL PROSTATECTOMY, RETROP Temp (24hrs), Av.2 °F (38.4 °C), Min:100.9 °F (38.3 °C), Max:101.6 °F (38.7 °C)       Intake/Output Summary (Last 24 hours) at 2019 1619  Last data filed at 2019 1426  Gross per 24 hour   Intake 3240 ml   Output —   Net 3240 ml     Wt Read 12/19/2018    BAPERCENT 0.2 12/19/2018    NE 2.81 12/19/2018    LYMABS 3.96 12/19/2018    MOABSO 24.76 (H) 12/19/2018    EOABSO 0.11 12/19/2018    BAABSO 0.07 12/19/2018     Lab Results   Component Value Date    CREUR 140.00 09/07/2018     Lab Results   Co scan/pvr, renal US  -- avoid nsaids, iv contrast, other nephrotoxins. Renally dose meds    Hyperkalemia  -- sp insulin/dextrose/bicarb/caglu in ED  -- suspect this is pseudohyperkalemia due to elevated WBCs.  This form of pseudohyperkalemia can appear in nesha

## 2019-01-20 NOTE — CONSULTS
INFECTIOUS DISEASE CONSULTATION    Estrellita Shows Patient Status:  Emergency    10/9/1935 MRN [id number]   Location [address] Attending Susan Castaneda MD   [facility] Day # 0 LAPAROSCOPY, SURGICAL PROSTATECTOMY, RETROPUBIC RADICAL, W/NERVE SPARING      laparoscopic   • OTHER      right carotid stent   • OTHER      leg stent   • OTHER      lumbar discectomy   • PORT, INDWELLING, IMP     • RIGHT PHACOEMULSIFICATION OF CATARACT WI TAKE 2 CAPSULES BY MOUTH IN THE AM, THEN 3 CAPS IN THE PM Disp: 450 capsule Rfl: 1   lactulose 10 GM/15ML Oral Solution Take 30 mL (20 g total) by mouth 2 (two) times daily as needed.  Disp: 473 mL Rfl: 0   allopurinol 300 MG Oral Tab Take 1 tablet (300 mg nondistended. Positive bowel sounds. Musculoskeletal: Full range of motion of all extremities. No swelling noted. Joints: no effusions  Skin: No lesions.  No erythema, no open wounds      Laboratory Data:      Recent Labs   Lab  01/20/19   1147   RBC  2 Bronchiectasis ([facility] [address]     Mantle cell lymphoma, lymph nodes of multiple sites [facility])     Closed fracture of base of fifth metatarsal bone of left foot, initial encounter     Closed fracture of base of fifth metatarsal bone of left foot, with routine healing,

## 2019-01-21 NOTE — PHYSICAL THERAPY NOTE
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 ..   -   Moving to and from a bed to a chair (including a wheelch recommendations and discussed using sit<>Stand lift for safety of pt and staff. Exercise/Education Provided:    Educ: activity recommendations, role of inpt PT, DC recommendation, assessment findings, goals and expectations.     Bed mobility  Body mec Recommendations: Sub-acute rehabilitation(ELOS = 11-14 days)      PLAN  PT Treatment Plan: Bed mobility; Endurance; Patient education;Gait training;Strengthening;Transfer training;Balance training;Stair training  Rehab Potential : Good  Frequency (Obs): 5x/w

## 2019-01-21 NOTE — SLP NOTE
ADULT SWALLOWING EVALUATION    ASSESSMENT    ASSESSMENT/OVERALL IMPRESSION:  Pt seen at bedside this AM for bedside swallow evaluation. RN approved session. Pt admitted to hospital due to weakness, fever, and AMS. Pt diagnosed with urosepsis.  Pt's family r status should be maintained. Discussed having pt's family bring dentures into hospital from home- pt stated he will ask his son. RN aware.          RECOMMENDATIONS   Diet Recommendations - Solids: Puree  Diet Recommendations - Liquid: Nectar thick    Compen essential hypertension           Diet Prior to Admission: Regular; Thin liquids  Precautions: Aspiration    Patient/Family Goals:  To eat and drink safely    SWALLOWING HISTORY  Current Diet Consistency: NPO  Dysphagia History: Details included in Basim [id number] [facility], [place]  Pager [phone]

## 2019-01-21 NOTE — CONSULTS
[facility]  Report of Consultation    Rafaela Quinonez Patient Status:  Inpatient    10/9/1935 MRN [id number]   [facility] 4SW-A Attending Neelima Lopez MD   Hosp Day # 1 PCP Yesica Siddiqui MD     REASON FOR CONSULTATION:     Mantle c 7/5/2018    Performed by Edilberto Rico MD at [facility] MAIN OR   • LAPAROSCOPY, SURGICAL PROSTATECTOMY, RETROPUBIC RADICAL, W/NERVE SPARING      laparoscopic   • OTHER      right carotid stent   • OTHER      leg stent   • OTHER      lumbar discectomy   • PORT cholecalciferol (VITAMIN D3) cap/tab 5,000 Units, 5,000 Units, Oral, Daily  •  Phenytoin Sodium Extended (DILANTIN) ER cap 200 mg, 200 mg, Oral, QAM  •  Phenytoin Sodium Extended (DILANTIN) ER cap 300 mg, 300 mg, Oral, QPM  •  glucose (DEX4) oral liquid 15 past 24 hour(s))   BASIC METABOLIC PANEL (8)    Collection Time: 01/20/19  6:20 PM   Result Value Ref Range    Glucose 134 (H) 70 - 99 mg/dL    Sodium 142 136 - 144 mmol/L    Potassium 3.8 3.6 - 5.1 mmol/L    Chloride 112 (H) 101 - 111 mmol/L    CO2 19.0 ( Slide Review Slide reviewed, manual differential added    MANUAL DIFFERENTIAL    Collection Time: 01/21/19  4:30 AM   Result Value Ref Range    Neutrophil Absolute Manual 0.79 (L) 1.30 - 6.70 x10(3) uL    Lymphocyte Absolute Manual 73.47 (H) 0.90 - 4.00 POC Glucose 108 (H) 65 - 99 mg/dL   HEMOGLOBIN    Collection Time: 01/21/19 11:26 AM   Result Value Ref Range    HGB 7.8 (L) 13.0 - 17.0 g/dL   POCT GLUCOSE    Collection Time: 01/21/19  4:20 PM   Result Value Ref Range    POC Glucose 122 (H) 65 - 99 mg Improvement. Continued mild primarily interstitial opacities in the central lung bilateral, but shows improvement from prior. No new or worsening process. No dense consolidation. Stable cardiac enlargement with left ventricular prominence. No CHF.   St hematoma (HCC)     Mantle cell lymphoma (HCC)     Anemia, normocytic normochromic     Thrombocytopenia (HCC)     Lymphocytosis     NHL (non-Hodgkin's lymphoma) (HCC)     Pleural effusion     Hyponatremia     Leukocytosis     Metabolic acidosis     Sepsis d

## 2019-01-21 NOTE — PROGRESS NOTES
01/21/19 1111   Clinical Encounter Type   Visited With Patient   Patient's Supportive Strategies/Resources Patient finds spiritual/community support at [address]   Referral To Nurse  Mehreen Chadwick made a referral to [employer] for C

## 2019-01-21 NOTE — CONSULTS
[facility] Pharmacy Note:  Renal Adjustment for oseltamivir (TAMIFLU)    Marlene Montes De Oca is a 82year old male who has been prescribed oseltamivir (TAMIFLU) 75 mg every 24 hrs.   CrCl is estimated creatinine clearance is 27.5 mL/min (A) (based on SCr of 1.97 mg/dL

## 2019-01-21 NOTE — CONSULTS
Pulmonary H&P/Consult       NAME: Sonya Dill - ROOM: [address] [place] [address] - MRN: [id number] - Age: 82year old - :  10/9/1935    Date of Admission: 2019 11:13 AM  Admission Diagnosis: Hyperkalemia [E87.5]  Delirium, acute [R41.0]  Thrombocytopenia ([facility] [address] [ SURGERY  07/05/2018    RIGHT FRONTAL TEMPORAL PARIETAL SHANAE HOLES POSSIBLE CRANIOTOMY   • BRONCHOSCOPY N/A 10/15/2014    Performed by Kavon Jorgensen MD at [facility] ENDOSCOPY   • CABG     • CABG, ARTERIAL, SINGLE  2000   • CAROTID ENDARTERECTOMY  2000    right daily. Disp:  Rfl:  Past Week at Unknown time   Calcium Carbonate-Vitamin D 600-400 MG-UNIT Oral Tab Take 1 tablet by mouth daily. Disp:  Rfl:  Past Week at Unknown time   Vitamin B-12 1000 MCG Oral Tab Take 1,000 mcg by mouth daily.  Disp:  Rfl:  Past Week Smoking status: Former Smoker        Packs/day: 1.00        Years: 65.00        Pack years: 72        Types: Cigarettes        Start date: 1953        Quit date: 2014        Years since quittin.3      Smokeless tobacco: Never Used    [employer] Oral Solution Take 30 mL (20 g total) by mouth 2 (two) times daily as needed. Disp: 473 mL Rfl: 0   allopurinol 300 MG Oral Tab Take 1 tablet (300 mg total) by mouth daily. Disp: 30 tablet Rfl: 3   atorvastatin 40 MG Oral Tab Take 40 mg by mouth nightly.  D CARDIOVASCULAR:  denies current chest pain   GI:  denies abdominal pain  :  denies dysuria or changes in stream   MUSCULOSKELETAL:  denies back pain   NEURO:  denies headaches, no strokes or seizure history   PSYCHE:  denies depression or anxiety   HEM non-tender, bowel sounds active all four quadrants,     no masses, no organomegaly   Extremities:   Extremities normal, atraumatic, no cyanosis or edema   Pulses:   2+ and symmetric all extremities   Skin:   Skin color, texture, turgor normal, no rashes or .                           Routine Therapy:                  2.0 - 3.0                           Recurrent Myocardial Infarction or                             Mechanical Prosthetic Valves:   2.5 - 3.5     TSH   Date/Time Value Re to see  -monitor lytes, replace per renal  10. Proph  -heparin  11.  Dispo  -ICU, probable transfer to the floor later today                   [facility] Pulmonary and Critical Care

## 2019-01-21 NOTE — PROGRESS NOTES
[facility] Hospitalist Progress Note                                                                   [address]  10/9/1935    SUBJECTIVE: pt is confused. He denies cp sob and n/v.   Does not mg Oral Nightly   • carBAMazepine  200 mg Oral BID   • cholecalciferol  5,000 Units Oral Daily   • Phenytoin Sodium Extended  200 mg Oral QAM   • Phenytoin Sodium Extended  300 mg Oral QPM   • piperacillin-tazobactam  3.375 g Intravenous Q8H     Continuous

## 2019-01-21 NOTE — PROGRESS NOTES
[facility]                INFECTIOUS DISEASE PROGRESS NOTE    Elsa Huerta Patient Status:  Inpatient    10/9/1935 MRN [id number]   [facility] 4SW-A Attending Cindy Barrett MD   Hosp Day # 1 PCP Dany Nodal, MD Leona Claude --   26   --    --    --    BILT  0.30   --   0.8   --    --    --    TP  7.3   --   6.8   --    --    --     < > = values in this interval not displayed. No results found for: [facility]  Microbiology    No results found for this visit on 01/20/19.   pend Delirium, acute     Acute renal failure, unspecified acute renal failure type (HCC)     Hyperkalemia     Anemia, unspecified type      ASSESSMENT/PLAN:  1. UTI/urinary retention  Was straight cath overnight 500cc cloudy  --UA showed 5-10 wbc, cx pending, b

## 2019-01-21 NOTE — PROGRESS NOTES
[facility]    Nephrology Progress Note    Neha Orta Attending:  Rodrick uCnha MD     Cc: REYNA, hyperkalemia    SUBJECTIVE     No acute events noted  Incontinent at times  Then had Urinary retention requiring straight cath w ~500cc uop    PHYSICA Units Oral Daily   Phenytoin Sodium Extended (DILANTIN) ER cap 200 mg 200 mg Oral QAM   Phenytoin Sodium Extended (DILANTIN) ER cap 300 mg 300 mg Oral QPM   glucose (DEX4) oral liquid 15 g 15 g Oral Q15 Min PRN   Or      Glucose-Vitamin C (DEX-4) 4-6 GM-MG Luke Almanza is a a(n) 82year old male w ho mmp including NHL, COPD, DM2, HTN who presented with weakness and fevers.      REYNA on CKD  -- likely 2/2 prerenal physiology due to poor po intake/sepsis  -- Baseline Cr mostly 1.0-1.3. Cr increased to 1.

## 2019-01-21 NOTE — PROGRESS NOTES
ICU  Critical Care APRN Progress Note    NAME: Andra Marie - ROOM: [address] [place] [address] - MRN: [id number] - Age: 82year old - :10/9/1935    History Of Present Illness:  Andra Marie is a 82year old male with PMHx significant for copd, cad, reflux, past mi, Comment: rare    Drug use: No      Family Hx:  Family History   Problem Relation Age of Onset   • Psychiatric Mother         nervous breakdowns   • Psychiatric Daughter         mental health, substance abuse   • Other Son         ms, asthma   • Diabete CONCLUSION:    Improvement. Continued mild primarily interstitial opacities in the central lung bilateral, but shows improvement from prior. No new or worsening process. No dense consolidation.   Stable cardiac enlargement with left ventricular prominenc A total of 35 minutes of critical care time (exclusive of billable procedures) was administered. This involved direct patient intervention, complex decision making, and/or extensive discussions with the patient, family, and clinical staff.     [facility]

## 2019-01-21 NOTE — PROGRESS NOTES
[facility] Pharmacy Note:  Renal Adjustment for Gabapentin     Michelle Magaña is a 82year old male who has been prescribed Gabapentin  800 mg po qid. Pharmacy has been consulted to adjust dose for renal function per Dr. Lola Manzano.  CrCl is estimated creatinine cl

## 2019-01-21 NOTE — PHYSICAL THERAPY NOTE
Attempted to see pt. Pt currently getting a unit of blood, due to Hgb of 6.3 this am.  Pt also with temp this am of 101, with WBC of 79.0, but high WBC may also be related to Mantle Cell Lymphoma.   Will see pt after unit of blood is fully transfused and o

## 2019-01-21 NOTE — PROGRESS NOTES
01/21/19 1125   Clinical Encounter Type   Visited With Patient; Health care provider   Referral To Nurse;Physician  ( scanned signed POLST form, dated 1/2019, into patient's electronic resuscitation wishes/POLST record. )

## 2019-01-22 PROBLEM — Z71.89 GOALS OF CARE, COUNSELING/DISCUSSION: Status: ACTIVE | Noted: 2019-01-01

## 2019-01-22 PROBLEM — Z51.5 PALLIATIVE CARE ENCOUNTER: Status: ACTIVE | Noted: 2019-01-01

## 2019-01-22 NOTE — PROGRESS NOTES
[facility]    Nephrology Progress Note    Janette Stein Attending:  Pineda Banks MD     Cc: REYNA, hyperkalemia    SUBJECTIVE     SOB worsened, on bipap  Sp lasix 60 IV this am    PHYSICAL EXAM   Vital signs: /51   Pulse (!) 121   Temp 98.5 °F mg Oral QAM   Amitriptyline HCl (ELAVIL) tab 100 mg 100 mg Oral Nightly   atorvastatin (LIPITOR) tab 40 mg 40 mg Oral Nightly   carBAMazepine (TEGRETOL) tab 200 mg 200 mg Oral BID   cholecalciferol (VITAMIN D3) cap/tab 5,000 Units 5,000 Units Oral Daily ASSESSMENT & PLAN     Connie San is a a(n) 82year old male w ho mmp including NHL, COPD, DM2, HTN who presented with weakness and fevers.      REYNA on CKD  -- likely 2/2 prerenal physiology due to poor po intake/sepsis  -- Baseline Cr mostly 1.0

## 2019-01-22 NOTE — PROGRESS NOTES
This note also relates to the following rows which could not be included:  Resp - Cannot attach notes to unvalidated device data  Pulse - Cannot attach notes to unvalidated device data  SpO2 - Cannot attach notes to unvalidated device data       01/22/19 1

## 2019-01-22 NOTE — PROGRESS NOTES
[facility]  Progress Note    Abi Muir Patient Status:  Inpatient    10/9/1935 MRN [id number]   [facility] 4SW-A Attending Camacho Dwyer, [address] [facility] Day # 2 PCP Jose Bone MD     Subjective:    More confused and worsening Result Value Ref Range    Blood Product [id number]     Unit Number [id number]     UNIT ABO A     UNIT RH POS     Product Status Presumed Transfused     Expiration Date [id number]     Blood Type Barcode 5457    BASIC METABOLIC PANEL (8)    Collection Monocyte % Manual 2 %    Eosinophil % Manual 0 %    Basophil % Manual 0 %    Total Cells Counted 100     RBC Morphology See morphology below (A) Normal    Platelet Morphology Normal Normal    Macrocytosis Small (A) (none)    Smudge Cells Marked (A) (none) Amitriptyline HCl  100 mg Oral Nightly   • atorvastatin  40 mg Oral Nightly   • carBAMazepine  200 mg Oral BID   • cholecalciferol  5,000 Units Oral Daily   • Phenytoin Sodium Extended  200 mg Oral QAM   • Phenytoin Sodium Extended  300 mg Oral QPM   • pip

## 2019-01-22 NOTE — PROGRESS NOTES
[facility]                INFECTIOUS DISEASE PROGRESS NOTE    Abi Muir Patient Status:  Inpatient    10/9/1935 MRN [id number]   [facility] 4SW-A Attending Edson Ku MD   Hosp Day # 2 PCP MD Aki Senior --    ALT  20  26   --    --    --    --    BILT  0.30  0.8   --    --    --    --    TP  7.3  6.8   --    --    --    --     < > = values in this interval not displayed.        No results found for: [facility] Encounter on 01/20/19 healing, subsequent encounter     Acute subdural hematoma (HCC)     Mantle cell lymphoma (HCC)     Anemia, normocytic normochromic     Thrombocytopenia (HCC)     Lymphocytosis     NHL (non-Hodgkin's lymphoma) (HCC)     Pleural effusion     Hyponatremia

## 2019-01-22 NOTE — PLAN OF CARE
Took over care for patient 1/21 1930. Patient restless, confused, and drowsy. Patient continued to have increased 02 needs and increased lethargy throughout the night.  ABGs drawn x2 (see results) patient placed on bipap this AM. Patient in sinus tach throu

## 2019-01-22 NOTE — PROGRESS NOTES
Notified of continued hypoxia on repeat ABG this AM.    Earlier in night RN reported that patient was tachypneic and tachycardic, with increased WOB.   ABG was collected on [facility] which showed compensated metabolic acidosis with mild hypoxia (7.40, 28, 52, 17

## 2019-01-22 NOTE — PHYSICAL THERAPY NOTE
Attempted to see pt. Pt with significant decline in medical status. Pt now on BiPap and it appears that he may need intubation. Now also requiring pressors.   Discussed with nursing, Geoff Wright, and it seems most appropriate for inpt PT to sign off the chaz

## 2019-01-22 NOTE — CONSULTS
Palliative Care Consult request from Dr. Barbara Key, noted requesting discussion for goals of  Care and uncontrolled symptoms. Tashia BRANDT discussed consultation request with patient's son, Gallito Talley   ([phone]) via phone today.  Family meeting with

## 2019-01-22 NOTE — PROGRESS NOTES
Gregory Greco Patient Status:  Inpatient    10/9/1935 MRN [id number]   [facility] 4SW-A Attending Lisandra Alexander, [address] [facility] Day # 2 PCP Gaby Dyer MD     Critical Care Progress Note     Date of Admission: 20 Q15 Min PRN **OR** dextrose 50 % injection 50 mL, 50 mL, Intravenous, Q15 Min PRN **OR** glucose (DEX4) oral liquid 30 g, 30 g, Oral, Q15 Min PRN **OR** Glucose-Vitamin C (DEX-4) 4-6 GM-MG chewable tab 8 tablet, 8 tablet, Oral, Q15 Min PRN  •  ipratropium- 01/22/2019    MCHC 32.3 01/22/2019    RDW 17.2 01/22/2019    PLT 20.0 01/22/2019     Lab Results   Component Value Date     01/22/2019    K 4.7 01/22/2019     01/22/2019    CO2 17.0 01/22/2019    BUN 30 01/22/2019    CREATSERUM 1.73 01/22/2019 minutes    Ellen Longoria MD  1/22/2019  7:15 AM

## 2019-01-22 NOTE — PLAN OF CARE
Delirium    • Minimize duration of delirium Not Progressing        Impaired Communication    • Patient will achieve maximal communication potential Not Progressing        Impaired Swallowing    • Minimize aspiration risk Not Progressing        RESPIRATORY

## 2019-01-22 NOTE — CONSULTS
[address] [facility]  Report of Consultation    Elsa Huerta Patient Status:  Inpatient    10/9/1935 MRN [id number]   [facility] 4SW-A Attending Josie Stinson, [address] [facility] Day # 2 PCP Jairon Camacho MD     Reason for Consult Anemia, normocytic normochromic     Thrombocytopenia (HCC)     Lymphocytosis     NHL (non-Hodgkin's lymphoma) (HCC)     Pleural effusion     Hyponatremia     Leukocytosis     Metabolic acidosis     Sepsis due to urinary tract infection ([facility] [address]     Delirium, Take by mouth. Disp:  Rfl:    Amitriptyline HCl 50 MG Oral Tab Take 100 mg by mouth nightly. Disp:  Rfl:    gabapentin 400 MG Oral Cap Take 2 capsules (800 mg total) by mouth 4 (four) times daily.  Disp: 480 capsule Rfl: 1   lactulose 10 GM/15ML Oral Soluti unremarkable.     Physical Exam:    01/22/19  1500   BP: 114/51   Pulse: (!) 121   Resp: (!) 27   Temp:      Wt Readings from Last 3 Encounters:  01/22/19 : 156 lb 4.9 oz (70.9 kg)  01/17/19 : 149 lb 4.8 oz (67.7 kg)  12/27/18 : 159 lb 4.8 oz (72.3 kg) ALT  20  26   --    --    --    --    BILT  0.30  0.8   --    --    --    --    TP  7.3  6.8   --    --    --    --     < > = values in this interval not displayed.        Recent Labs   Lab  01/20/19   1147  01/21/19   0430  01/21/19   1126  01/22/19   04

## 2019-01-22 NOTE — SLP NOTE
Attempted to see pt for video swallow study- RN reporting increased respiratory requirements of BiPap at this time. Not appropriate for participation in study. Will continue to follow and reevalaute when respiratory status improves. Thank you.     Carlos A Da Silva

## 2019-01-22 NOTE — PROGRESS NOTES
[facility] Hospitalist Progress Note                                                                   30 BIRAN Greco  10/9/1935    SUBJECTIVE: pt is confused. increased resp distress overnight req Intravenous Q48H   • pantoprazole (PROTONIX) IV push  40 mg Intravenous Daily   • gabapentin  600 mg Oral BID   • Insulin Aspart Pen  1-5 Units Subcutaneous TID CC and HS   • allopurinol  300 mg Oral Daily   • Amitriptyline HCl  50 mg Oral QAM   • Amitript wheezing     #HTN monitor BP    PPX: SCDS, low plt    DISPO: ICU  Palliative care consult for Amina 64, pt is DNR    Maninder [facility] Hospitalist  [phone]

## 2019-01-22 NOTE — PROGRESS NOTES
This note also relates to the following rows which could not be included:  Resp - Cannot attach notes to unvalidated device data  Pulse - Cannot attach notes to unvalidated device data  SpO2 - Cannot attach notes to unvalidated device data

## 2019-01-23 NOTE — PROGRESS NOTES
This note also relates to the following rows which could not be included:  Resp - Cannot attach notes to unvalidated device data  Pulse - Cannot attach notes to unvalidated device data  SpO2 - Cannot attach notes to unvalidated device data       01/23/19 0

## 2019-01-23 NOTE — PROGRESS NOTES
[facility]    Nephrology Progress Note    Connie San Attending:  Yanique Grimm MD     Cc: REYNA, hyperkalemia    SUBJECTIVE     All parameters have worsen today  Creatinine is up.   More hypoxic    PHYSICAL EXAM   Vital signs: /45   Pulse 81 Oral Q6H PRN   allopurinol (ZYLOPRIM) tab 300 mg 300 mg Oral Daily   Amitriptyline HCl (ELAVIL) tab 50 mg 50 mg Oral QAM   Amitriptyline HCl (ELAVIL) tab 100 mg 100 mg Oral Nightly   atorvastatin (LIPITOR) tab 40 mg 40 mg Oral Nightly   carBAMazepine (TEGR cysts    Urinary retention/incontinence  -- baez    Hyperkalemia -pseudo-in the setting of significant leukocytosis    Acidosis  -- due to renal insuff and lactic acidosis. Mild. Monitor      Sepsis  -- on abx. Per ID     Hyponatremia  -- mild. Monitor.  R

## 2019-01-23 NOTE — PROGRESS NOTES
01/23/19 1613   Clinical Encounter Type   Visited With Patient and family together;Health care provider  Tyree Leon, daughter, RN)   Routine Visit Introduction   Continue Visiting (If requested)   Crisis Visit (Per RN, patient will have breathing support

## 2019-01-23 NOTE — PROGRESS NOTES
[facility]  Progress Note    Connie San Patient Status:  Inpatient    10/9/1935 MRN [id number]   [facility] 4SW-A Attending Noah Hanson, [address] [facility] Day # 3 PCP Mitzy Covarrubias MD     Subjective:    More confused and worsening 10.0 - 20.0    Calcium, Total 9.1 8.3 - 10.3 mg/dL    Calculated Osmolality 322 (H) 275 - 295 mOsm/kg    GFR, Non- 25 (L) >=60    GFR, -American 29 (L) >=60   CBC W/ DIFFERENTIAL    Collection Time: 01/23/19  4:47 AM   Result Value R for acute pulmonary embolus. Dictated by: Sara Young MD on 1/22/2019 at 16:39     Approved by: Sara Young MD              Medications reviewed.     • dextrose  500 mL Intravenous Once   • phenytoin  250 mg Intravenous 2 times per day   • levo

## 2019-01-23 NOTE — PROGRESS NOTES
30 BRIAN Greco Patient Status:  Inpatient    10/9/1935 MRN [id number]   [facility] 4SW-A Attending Josie Stinson, [address] [facility] Day # 3 PCP Jairon Camacho MD     Pulm / Critical Care Progress Note     S: remains on bipap PIGGYBACK:300]  Out: 5130 [AZCML:[id number]]     Physical Exam:   General: on bipap   Head: Normocephalic, without obvious abnormality, atraumatic. Lungs: coarse bs bilat   Chest wall: No tenderness or deformity.    Heart: Regular rate and rhythm, normal S1S2, levo  3. UTI  -awaiting urine culture  -cont abx as noted above  4. REYNA on CKD  -due to shock  -renal following  -good uop; sCr worse today though given diuresis. 5. Anemia  -suspect dilution and decreased production  -transfuse to maintain hgb > 7   6.

## 2019-01-23 NOTE — PROGRESS NOTES
[facility] Hospitalist Progress Note                                                                   30 BRIAN Greco  10/9/1935    SUBJECTIVE: pt on bipap, seems to be declining overall.      OBJE 118*  103*  141*  128*  166*       Meds:   Scheduled:   • ipratropium-albuterol  3 mL Nebulization QID   • phenytoin  250 mg Intravenous 2 times per day   • levofloxacin  750 mg Intravenous Q48H   • pantoprazole (PROTONIX) IV push  40 mg Intravenous Daily reviewed  -vq low prob    #Altered mental status  -suspect 2/2 above  -check ct brain - no acute findings     #NHL, Thrombocytopenia, anemia leukocytosis; consult oncology  -transfuse PRN, oncology following    #REYNA, Hyperkalemia  -IVF and D50, insulin, al

## 2019-01-23 NOTE — PROGRESS NOTES
[facility] Cardiology Progress Note        Marcy De Los Santos Patient Status:  Inpatient    10/9/1935 MRN [id number]   [facility] 4SW-A Attending Briseida Hassan, [address] [facility] Day # 3 PCP Liane Benson MD     Subjective: normal,  rub or gallop. No murmur. Lungs: CTA  Abdomen: Soft, non-tender. Extremities: No edema  Neurologic: no focal deficits  Skin: Warm and dry.      Telemetry: sinus    EKG:      Echo:      Cardiac Cath:      Labs:  HEM:  Recent Labs   Lab  01/17/19

## 2019-01-23 NOTE — PROGRESS NOTES
[facility]                INFECTIOUS DISEASE PROGRESS NOTE    Giovana oMn Patient Status:  Inpatient    10/9/1935 MRN [id number]   [facility] 4SW-A Attending Genie Osler, MD   Hosp Day # 3 PCP MD Maddie Block --    --    BILT  0.30  0.8   --    --    --    --    TP  7.3  6.8   --    --    --    --     < > = values in this interval not displayed. No results found for: [facility] Encounter on 01/20/19   1.  URINE CULTURE, ROUTINE     Sta encounter     Acute subdural hematoma (HCC)     Mantle cell lymphoma (HCC)     Anemia, normocytic normochromic     Thrombocytopenia (HCC)     Lymphocytosis     NHL (non-Hodgkin's lymphoma) (HCC)     Pleural effusion     Hyponatremia     Leukocytosis     Me

## 2019-01-23 NOTE — PLAN OF CARE
RESPIRATORY - ADULT    • Achieves optimal ventilation and oxygenation Not Progressing          CARDIOVASCULAR - ADULT    • Maintains optimal cardiac output and hemodynamic stability Progressing        Delirium    • Minimize duration of delirium Progressing

## 2019-01-23 NOTE — CONSULTS
[facility] Patient Status:  Inpatient    10/9/1935 MRN [id number]   [facility] 4SW-A Attending Miriam Rae, [address] [facility] Day # 3 PCP Alisa Brenner MD     Date of Consult:  • Closed head injury dec1990   • COPD (chronic obstructive pulmonary disease) ([facility])    • Coronary atherosclerosis of unspecified type of vessel, native or graft    • Esophageal reflux    • Hearing impairment    • Heart attack [facility])    • History of blood Patient Live Alone: no  Does Patient have Legal Guardian: no  Is Patient Confused: yes       Goals of Care Discussion:   I discussed reason for palliative care consultation with his wife and with Rubina Hinds his son.     We discussed current clinical condition and Extensive Disease    40   Mainly in bed    Can't do any work      Mainly Assist    Normal or reduced     Full or confused                                  Extensive Disease     30   Bed Bound       Can't do any work      Max Assist        Reduced 01/21/2019    PTT 37.7 (H) 01/21/2019       Chemistry:  Lab Results   Component Value Date    CREATSERUM 2.33 (H) 01/23/2019    BUN 41 (H) 01/23/2019     (H) 01/23/2019    K 4.5 01/23/2019     (H) 01/23/2019    CO2 20.0 (L) 01/23/2019    GLU 13 mask    Palliative Performance Scale : 3040%          Healthcare Agent Appointed:  Other (Comment)(Mrs Beatris Thomson is making [facility]. decisions with her son and her daught)                Spiritual needs addressed: Referral placed for Spiritual Care    Disposition: hos PM  System Associate Medical Director [address] [place] [address] [address] [place] [phone]

## 2019-01-24 NOTE — PLAN OF CARE
CARDIOVASCULAR - ADULT    • Maintains optimal cardiac output and hemodynamic stability Not Progressing        Delirium    • Minimize duration of delirium Not Progressing        GENITOURINARY - ADULT    • Absence of urinary retention Not Progressing

## 2019-01-24 NOTE — PLAN OF CARE
Received patient at 1930 for night shift. Patient on comfort care orders. Morphine gtt running, titrated per order. PRN morphine and ativan given appropriately. Patient's wife, son, and daughter at bedside. Patient time of death at [phone].  Asystole on mo

## 2019-01-24 NOTE — PROGRESS NOTES
01/23/19 4772   Clinical Encounter Type   Visited With Family; Health care provider   Routine Visit Follow-up   Continue Visiting No   Crisis Visit Death   Referral From Nurse   Patient Spiritual Encounters   Spiritual Assessment Completed No   Spiritual

## 2019-01-29 NOTE — DISCHARGE SUMMARY
General Medicine Discharge Summary     Patient ID:  Estrellita Berg  82year old  10/9/1935    Admit date: 1/20/2019    Discharge date and time: 1/23/2019 11:04 PM     Attending Physician: Brad Heredia (CALQUENCE) 100 MG Oral Cap  Take by mouth., Historical    !! Amitriptyline HCl 50 MG Oral Tab  Take 100 mg by mouth nightly., Historical    lenalidomide 25 MG Oral Cap  Take 25 mg by mouth daily for 28 days. , Normal, Disp-21 capsule, R-11    Ergotamine-PB medications found. Please discuss with provider.          Exam on day of discharge:      01/23/19  2305   BP:    Pulse: (!) 0   Resp: (!) 0   Temp:        General: no acute distress   Heart: RRR  Lungs  no active wheezing  Abdomen: nontender, nondistended,

## 2019-09-25 NOTE — CM/SW NOTE
PC meeting with family today at 56. Advised in ICU rounds that pt clinically worsening, onc advising progression of previous NHL. On bipap 1.0 FiO2. Plan: / to remain available for support and/or discharge planning.    Jannette Ponce Walk in

## 2023-09-14 NOTE — PROGRESS NOTES
HGB=6.6, will recv 1 unit PRBC's then reassess, [place], forgetful appetite poor , R  portacath accessed , labs drawn,  Infusion of PRBC's initiated, recent fall , walks w/ walker , weak, Initial (On Arrival)

## (undated) DEVICE — AGENT HMST STRL KT MTRX THRMB

## (undated) DEVICE — GLOVE BIOGEL M SURG SZ 71/2

## (undated) DEVICE — SUTURE VICRYL 2-0 CP-2

## (undated) DEVICE — TRANSPOSAL ULTRAFLEX DUO/QUAD ULTRA CART MANIFOLD

## (undated) DEVICE — SOL  .9 1000ML BTL

## (undated) DEVICE — PAD SACRAL SPAN AID

## (undated) DEVICE — CODMAN® DISPOSABLE PERFORATOR 14MM: Brand: CODMAN®

## (undated) DEVICE — GLOVE SURG TRIUMPH SZ 8

## (undated) DEVICE — SUTURE VICRYL 0 CT-1

## (undated) DEVICE — SOLUTION ANSEP 70% ISOPRPNL

## (undated) DEVICE — CRANIOTOMY CDS: Brand: MEDLINE INDUSTRIES, INC.

## (undated) DEVICE — GLOVE SURG SENSICARE SZ 7-1/2

## (undated) DEVICE — SUTURE ETHILON 3-0 PS-1

## (undated) DEVICE — DRILL SRG OIL CRTDG MAESTRO

## (undated) DEVICE — GLOVE SURG TRIUMPH SZ 71/2

## (undated) DEVICE — CATHETER,URETHRAL,REDRUBBER,STERILE,8FR: Brand: MEDLINE

## (undated) NOTE — IP AVS SNAPSHOT
[address]            (For Outpatient Use Only) Initial Admit Date: 7/3/2018   Inpt/Obs Admit Date: Inpt: 7/3/18 / Obs: N/A   Discharge Date:    Hero Watts:  [de-identified]   MRN: [de-identified]   CSN: [id number]        ENCOUNTER  Patient C

## (undated) NOTE — IP AVS SNAPSHOT
Patient Demographics     Address  [address]  [place] [address] Phone  [phone] (Home) *Preferred*  [phone] [facility])      Emergency Contact(s)     Name Relation Home Work Elder Bryan Spouse [phone]      Galen Rincon Next dose due:  9/21 MORNING       Take 75 mg by mouth daily. atorvastatin 40 MG Tabs  Commonly known as:  LIPITOR      Take 40 mg by mouth nightly. B Complex-C-Folic Acid Tabs      Take 1 tablet by mouth daily.           Calcium Carbonate [id number] Acalabrutinib CAPS 100 mg - Patient Supplied 09/19/18 1849 Given      [id number] Acalabrutinib CAPS 100 mg - Patient Supplied 09/20/18 0606 Given      [id number] Amitriptyline HCl (ELAVIL) tab 100 mg 09/19/18 2036 Given      [id number] PEG 3350 (MI Result status: Final result   Ordering provider:  Anahi Main MD  09/19/18 2300 Resulting lab:  [facility]   Narrative: The following orders were created for panel order CBC WITH DIFFERENTIAL WITH PLATELET.   Procedure                               Abno Ordering provider:  Ludin Warren MD  09/20/18 9339 Resulting lab:  [facility]    Specimen Information    Type Source Collected On   Blood — 09/20/18 0619          Components    Component Value Reference Range Flag Lab   Slide Review Slide reviewed, mary He was seen in clinic and then admitted given sig lab abnormalities. Pt fell yesterday. No head trauma. [JS.1]      Appetite has been poor. [JS.2]      Past Medical History:   Diagnosis Date   • Atherosclerosis of coronary artery    • Brain bleed ([facility] [address] 0 Family History   Problem Relation Age of Onset   • Psychiatric Mother      nervous breakdowns   • Psychiatric Daughter      mental health, substance abuse   • Other Son      ms, asthma   • Diabetes Son    • Psychiatric Sister      depression   • Hypertensi HEAD ([id number]), 7/03/2018, 14:23. [facility] , CT BRAIN OR HEAD ([id number]), 8/17/2018, 12:18.   INDICATIONS:  S06.5X9A Traumatic subdural hemorrhage with loss of consciousness of unspecified duration, initial encounter  TECHNIQUE:  Noncontrast CT scanning is perfor Approved by: Mahogany Steele MD            Ct Brain Or Head ([id number])    Result Date: 8/17/2018  PROCEDURE:  CT BRAIN OR HEAD ([id number])  COMPARISON:  [facility] CT BRAIN OR HEAD ([id number]), 7/09/2018, 15:08.   INDICATIONS:  Z98.890 Other specified postprocedural 8/17/2018 at 12:44     Approved by: Emilia Beckford MD               Assessment/Plan:     Anemia, thrombocytopenia, significant leukocytosis  - secondary to mantle cell lymphoma and chemo  - prbc ordered, transfuse for Hg < 7.0    REYNA, acute tumor lysis syndrom Location [facility] 4NW-A Attending Ash Teague MD   Hosp Day # 7 PCP Candido Roberts MD     Patient Identification  Amador Wiggins is a [de-identified]year old male.   :  10/9/1935  Admit Date:  2018  Attending Provider:  Hossein Rosario Home Environment / Accessibility: 1-story house/ trailer, number of outside stairs: 7  Current Functional Status: Maximal assist for transfers. Gait 2 feet maximal assistance. Bed abilities moderate assistance.       Past Medical History:  @Medical hx@  P 10/5/2009: RIGHT PHACOEMULSIFICATION OF CATARACT WITH INTRAOCULAR   LENS IMPLANT 30393; Right      Comment:  Performed by Candi Cross MD at [address] [place] [address],[address]  No date: THYROIDECTOMY      Comment:  partial      Umeclidinium B atenolol (TENORMIN) tab 75 mg 75 mg Oral Daily Beta Blocker   gabapentin (NEURONTIN) cap 800 mg 800 mg Oral QID   Phenytoin Sodium Extended (DILANTIN) ER cap 200 mg 200 mg Oral Daily       Social History    Tobacco Use      Smoking status: Former Smoker Lungs:   Resonant, clear breath sounds, quiet accessory muscles. Chest wall:  No tenderness or deformity. Cardiovascular:  Heart with regular rate rhythm, no murmurs appreciated. Radial and pedal pulses good. No cyanosis in all extremities.    Abdomen: good inpatient rehabilitation, working with PT/OT to upgrade present functional status, provide family training, assess home equipment and assistive device needs.   Acute rehab likely a better option with higher medical acuity with his acute respiratory romana Presenting Problem: anemia and management of tumor lysis    History related to current admission: Pt is [de-identified]year old male admitted on 9/6/2018 from home with c/o anemia and tumor lysis. Pt diagnosed with Non-Hodgkin's lymphoma.   Pt is s/p R subtemp decomp No date:  Atherosclerosis of coronary artery  07/2018: Brain bleed ([address]  No date: CANCER      Comment:  NHL mantle cell type  No date: Cataract      Comment:  surgery several years ago  dec1990: Closed head injury  No date: COPD (chronic obstructive pulmon Comment:  partial    SUBJECTIVE  \"I want to go to the bathroom\"     Patient’s self-stated goal is to walk more      OBJECTIVE  Precautions: Hard of hearing;Seizure; Other (Comment)(monitor O2 sats with activity )    WEIGHT BEARING RESTRICTION  Weight Pt demos fair + bal and is able to manage brief with supervision. Pt gait trained c rollator and CGA to supervision. After seated rest, pt participated in stair training x 11 stairs c B HR and CGA to supervision.  Pt asc with alternating pattern, desc with 2:30 PM  Version 1 of 1    Author:  Chanda Joe PTA Service:  — Author Type:  Physical Therapy Assistant    Filed:  9/20/2018  2:30 PM Date of Service:  9/19/2018  1:19 PM Status:  Signed    :  Chanda Joe PTA (Physical Therapy Assi effacement of the right lateral ventricle.)        Therapy significant co-morbidities:  - HTN; CAD; Atherosclerosis of native artery of extremity with intermittent Claudication; DM; memory loss (noted dx from 2009 in chart).  History of seizures.     Comment:  leg stent  No date: OTHER      Comment:  lumbar discectomy  No date: PORT, INDWELLING, IMP  10/5/09: Dom Canal CATARACT EXTRACAP,INSERT LENS      Comment:  right w IOL  10/5/2009: RIGHT PHACOEMULSIFICATION OF CATARACT WITH INTRAOCULAR   LENS IMPLA Distance (ft): 150  Assistive Device: (rollator)  Pattern: Shuffle  Stoop/Curb Assistance: Not tested  Comment : .    Skilled Therapy Provided:[CY.1] RN approved session. Pt presents in semi sup agreeable to participate. Min A sup>sit.  Pt requires mod A t Goal Comments: Goals established on 9/7/2018  All goals ongoing[CY. 1]     Attribution [place]    CY. 1 - Sheryl Daily, PTA on 9/19/2018  1:19 PM  CY. 2 - Sheryl Daily, PTA on 9/19/2018  2:12 PM  CY. 3 - Sheryl Daily, PTA on 9/20/2018  2:29 PM Procedure 7/5/18: RIGHT SUBTEMPORAL DECOMPRESSION,, SUBDURAL HEMATOMA  AND  RIGHT FRONTAL TEMPORAL PARIETAL SHANAE HOLES, SEPARATE INCISIONS. Problem List[LD.1]  Active Problems:    NHL (non-Hodgkin's lymphoma) (HCC)[LD.2]      Past Medical History[LD. 1 No date: PORT, INDWELLING, IMP  10/5/09: REMV CATARACT EXTRACAP,INSERT LENS      Comment:  right w IOL  10/5/2009: RIGHT PHACOEMULSIFICATION OF CATARACT WITH INTRAOCULAR   LENS IMPLANT [id number]; Right      Comment:  Performed by German Diallo MD at  aspects of toileting including transfer and stefano-care with supervision. Pt performed functional mobility with CG assistance and RW x approx 100ft>seated rest break in chair. Pt educated on deep breathing>O2 sats at 91%.  Pt performed stairs with PT with CGA maximizing patient’s ability to return safely to his prior level of function. [LD.1]    OT Discharge Recommendations: Home with home health PT/OT;24 hour care/supervision  OT Device Recommendations: TBD[LD.2]  PLAN[LD.1]  OT Treatment Plan: Balance activiti Pneumovax 23 12/18/17     Pneumovax 23 12/17/10     Regadenoson . 1mg per unit IV 06/17/13     TDAP 11/12/12     Technetium Tc99mm Sestamibi, Iv, Up To 40 Millicuries 85/35/50     Vitamin B-12 Up To 1000mcg, IM/SC Inj 08/13/13     Vitamin B-12 Up To 1000mc

## (undated) NOTE — ED AVS SNAPSHOT
Marcy Peto   MRN: [id number]    Department:  [facility] Emergency Department   Date of Visit:  12/19/2018           Disclosure     Insurance plans vary and the physician(s) referred by the ER may not be covered by your plan.  Please contact yo tell this physician (or your personal doctor if your instructions are to return to your personal doctor) about any new or lasting problems. The primary care or specialist physician will see patients referred from the [facility] Emergency Department.  Cheryle Levins

## (undated) NOTE — IP AVS SNAPSHOT
[address]            (For Outpatient Use Only) Initial Admit Date: 9/6/2018   Inpt/Obs Admit Date: Inpt: 9/6/18 / Obs: N/A   Discharge Date:    Trae Binet:  [de-identified]   MRN: [de-identified]   CSN: [id number]        ENCOUNTER  Patient C

## (undated) NOTE — IP AVS SNAPSHOT
Patient Demographics     Address  [address] [facility]  [address] [place] [id number] Phone  [phone] [facility])      Emergency Contact(s)     Name Relation Home Work [place] Spouse [address] Son [phone]      Lynnea Babinski Take 1 tablet by mouth daily. Calcium Carbonate-Vitamin D 600-400 MG-UNIT Tabs      Take 1 tablet by mouth daily. docusate sodium 100 MG Caps  Commonly known as:  COLACE      Take 100 mg by mouth daily as needed for constipation. [id number] atorvastatin (LIPITOR) tab 40 mg 07/08/18 2014 Given      [id number] docusate sodium (COLACE) cap 100 mg 07/08/18 2014 Given      [id number] docusate sodium (COLACE) cap 100 mg 07/09/18 0958 Given      [id number] famoTIDine (PEPCID) tab 20 mg (Or L Basophil % Manual 0 % — [facility]   Other Cell, Manual 43 — — [facility]   Comment:  Atypical lymphoid cells.    Total Cells Counted 100 — — [facility]   RBC Morphology Normal Normal — [facility]   Platelet Morphology Normal Normal — [facility] eGFR calculated by the CKD-EPI equation.    Calcium, Total 8.2 8.3 - 10.3 mg/dL [facility]   Sodium 139 136 - 144 mmol/L — [facility]   Potassium 4.5 3.6 - 5.1 mmol/L — [facility]   Chloride 107 101 - 111 mmol/L — [facility]   CO2 22.0 22.0 - 32.0 mmol/L :  Pineda Banks MD (Physician)       [facility] hospitalist H+P    PCP;[ID.1] Renaldo Taylor MD[ID.2]  CC fall  HPI [de-identified] yo male with multiple medical problems incluidng but not limited to CAD, DM2, HTN, COPD, seizures,  cancer, here s/p hitting a dresser ms, asthma   • Diabetes Son    • Psychiatric Sister      depression   • Hypertension Son    • Diabetes Son        Social History  Social History   Marital status:   Spouse name: N/A    Years of education: N/A  Number of children: N/A     Occupatio in thickness and along the right tentorium were measures up to 20 mm in thickness. There is 9 mm of leftward   midline shift. Continued imaging followup is recommended. Critical results were discussed with Dr. Angelo Schmitt at 1430 hr on 7/3/2018.  Critical re Primary Care Physician:  Bonny Sim MD   Admitting Diagnosis: Acute subdural hematoma ([facility] [address] [S06.5X9A]    Subjective:      Reason for Consultation: Impaired ADL and mobility dysfuction due to TBI  History of present illness:  Records reviewed, and pat • History of quadruple bypass    • Mantle cell lymphoma, lymph nodes of multiple sites [facility])    • Other and unspecified hyperlipidemia    • Other convulsions    • Seizure disorder (HCC)    • Type II or unspecified type diabetes mellitus without mention of Labetalol HCl (TRANDATE) injection 10 mg 10 mg Intravenous PRN   hydrALAzine HCl (APRESOLINE) injection 10 mg 10 mg Intravenous Q1H PRN   ondansetron HCl (ZOFRAN) injection 4 mg 4 mg Intravenous Q6H PRN   docusate sodium (COLACE) cap 100 mg 100 mg Oral BID gabapentin (NEURONTIN) cap 800 mg 800 mg Oral QID   Amitriptyline HCl (ELAVIL) tab 75 mg 75 mg Oral Nightly   Phenytoin Sodium (DILANTIN) injection 200 mg 200 mg Intravenous Daily   Phenytoin Sodium (DILANTIN) 300 mg in sodium chloride 0.9 % 50 mL IVPB 300 Ears/Nose/Throat: Hearing intact. Lips, mucosa, and tongue normal. Teeth and gums normal. Moist mucous membranes. Neck: No neck masses or thyroid enlargement/tenderness/nodules. Lungs:   Resonant, clear breath sounds, quiet accessory muscles.    C pressure sore prevention, bowel and bladder management, skin management. Physiatric medical oversight to monitor kidney function, cardiac function, pulmonary status, neurologic status, DVT prevention.                  Estimated length of stay in recomme • Coronary atherosclerosis of unspecified type of vessel, native or graft    • Esophageal reflux    • Hearing impairment    • Heart attack [facility])    • History of quadruple bypass    • Mantle cell lymphoma, lymph nodes of multiple sites [facility])    • Other and -   Sitting down on and standing up from a chair with arms (e.g., wheelchair, bedside commode, etc.): A Little   -   Moving from lying on back to sitting on the side of the bed?: A Little   How much help from another person does the patient currently need. to [facility] admission for SDH, s/p R subtemp decomp of SDH &right frontal/parietal melissa hole 7/5/18. Results of the AM-PAC \"6 clicks\" Inpatient Daily Mobility Short Form for the patient is 50.57% degree of basic mobility impairment.   At this ti PHYSICAL THERAPY TREATMENT NOTE - INPATIENT    Room Number: 6693/6239-O     Session: 2   Number of Visits to Meet Established Goals: 7    Presenting Problem: SDH, s/p R subtemp decomp of SDH &right frontal/parietal melissa hole 7/5/18    Problem List  Princi Management Techniques:  Activity promotion;Relaxation;Repositioning    BALANCE                                                                                                                     Static Sitting: Fair -  Dynamic Sitting: Fair -           Stat ambulate out in hallway and to sit up on chair at least for meals each day. Pt verbalized understanding and agreement.     THERAPEUTIC EXERCISES  Lower Extremity Alternating marching  Ankle pumps  Hip AB/AD  LAQ     Upper Extremity      Position Sitting Goal Comments: Goals established on 7/6/2018 progressing toward all ; goals ongoing 7/8/18   [MV.1]       Attribution [place]    MV. 1 - Becky Mcgowan, PT on 7/8/2018  3:27 PM               Physical Therapy Note signed by Estefany Delatorre PTA at [facility] • Coronary atherosclerosis of unspecified type of vessel, native or graft    • Esophageal reflux    • Hearing impairment    • Heart attack [facility])    • History of quadruple bypass    • Mantle cell lymphoma, lymph nodes of multiple sites [facility])    • Other and blankets)?: A Little   -   Sitting down on and standing up from a chair with arms (e.g., wheelchair, bedside commode, etc.): A Little   -   Moving from lying on back to sitting on the side of the bed?: A Lot   How much help from another person does the pat the patient is 57.7% degree of basic mobility impairment. Research supports that patients with this level of impairment often benefit from [address]. Pt able to progress amb distance this session c RW and min to mod A.  Pt able to follow commands and is pleasa Occupational Therapist    Filed:  7/7/2018 11:46 AM Date of Service:  7/7/2018 11:33 AM Status:  Signed    :  Chetna Fowler OT (Occupational Therapist)       OCCUPATIONAL THERAPY TREATMENT NOTE - INPATIENT     Room Number: 8672/5835-E  DBXP No date: LAPAROSCOPY, SURGICAL PROSTATECTOMY, RETROPUBI*      Comment: laparoscopic  No date: OTHER      Comment: right carotid stent  No date: OTHER      Comment: leg stent  No date: OTHER      Comment: lumbar discectomy  No date: PORT, INDWELLING, IMP  1 Goals to be updated. Patient End of Session: Up in chair;Needs met;Call light within reach;RN aware of session/findings; All patient questions and concerns addressed    ASSESSMENT   Progressing. Goals to be updated today.  Pt still w deficits in decreased s Pt will follow 2 step instructions needed to complete ADLs  Met 7/7/18  Pt will maintain attention during tx session with 1 cue to redirect  Met 7/7/18[KG. 1]       Attribution Key    KG. 1 - Tony Lo, OT on 7/7/2018 11:33 AM seen for cognitive communication assessment and treatment as appropriate at next level of care. Diet Recommendations - Solids: Regular  Diet Recommendations - Liquid: Thin    Compensatory Strategies Recommended: No straws; Slow rate;Small bites and sips INFLUENZA 09/11/14     INFLUENZA 09/15/13     INFLUENZA 12/17/10     INFLUENZA 09/23/09     INFLUENZA 10/21/08     INFLUENZA 12/07/07     Influenza Virus Vaccine, Split 09/15/12     Influenza Virus Vaccine, Split 09/29/11     Pegfilgrastim Injection 04/10

## (undated) NOTE — LETTER
[facility]  [address] [facility], [address] [place] [address]    Consent for Operation    Date: __2-8-2055________________    Time: ____11:00___________    1.  I authorize the performance upon Estrellita Shows the following operation:    Procedure(s):  RIGHT F procedure has been videotaped, the surgeon will obtain the original videotape. The hospital will not be responsible for storage or maintenance of this tape.     6. For the purpose of advancing medical education, I consent to the admittance of observers to t STATEMENTS REQUIRING INSERTION OR COMPLETION WERE FILLED IN.     Signature of Patient:   ___________________________    When the patient is a minor or mentally incompetent to give consent:  Signature of person authorized to consent for patient: ____________ drugs/illegal medications). Failure to inform my anesthesiologist about these medicines may increase my risk of anesthetic complications. · If I am allergic to anything or have had a reaction to anesthesia before.     3. I understand how the anesthesia med I have discussed the procedure and information above with the patient (or patient’s representative) and answered their questions. The patient or their representative has agreed to have anesthesia services.     _______________________________________________

## (undated) NOTE — LETTER
[address] [facility] FOR BLOOD OR BLOOD COMPONENTS      In the course of your treatment, it may become necessary to administer a transfusion of blood or blood components.  This form provides basic information concerning this proc explain the alternatives to you if it has not already been done. Nick Smith, have read/had read to me the above. I understand the matters bearing on the decision whether or not to authorize a transfusion of blood or blood components.  I have no ques